# Patient Record
Sex: FEMALE | Race: WHITE | Employment: UNEMPLOYED | URBAN - METROPOLITAN AREA
[De-identification: names, ages, dates, MRNs, and addresses within clinical notes are randomized per-mention and may not be internally consistent; named-entity substitution may affect disease eponyms.]

---

## 2017-03-30 ENCOUNTER — ED HISTORICAL/CONVERTED ENCOUNTER (OUTPATIENT)
Dept: OTHER | Age: 27
End: 2017-03-30

## 2017-04-11 ENCOUNTER — OP HISTORICAL/CONVERTED ENCOUNTER (OUTPATIENT)
Dept: OTHER | Age: 27
End: 2017-04-11

## 2017-04-20 ENCOUNTER — OP HISTORICAL/CONVERTED ENCOUNTER (OUTPATIENT)
Dept: OTHER | Age: 27
End: 2017-04-20

## 2019-09-11 ENCOUNTER — ED HISTORICAL/CONVERTED ENCOUNTER (OUTPATIENT)
Dept: OTHER | Age: 29
End: 2019-09-11

## 2021-09-20 ENCOUNTER — OFFICE VISIT (OUTPATIENT)
Dept: OBGYN CLINIC | Age: 31
End: 2021-09-20
Payer: COMMERCIAL

## 2021-09-20 VITALS
SYSTOLIC BLOOD PRESSURE: 114 MMHG | BODY MASS INDEX: 32.52 KG/M2 | HEIGHT: 64 IN | WEIGHT: 190.5 LBS | DIASTOLIC BLOOD PRESSURE: 68 MMHG

## 2021-09-20 DIAGNOSIS — N92.0 MENORRHAGIA WITH REGULAR CYCLE: ICD-10-CM

## 2021-09-20 DIAGNOSIS — Z01.419 ROUTINE GYNECOLOGICAL EXAMINATION: ICD-10-CM

## 2021-09-20 DIAGNOSIS — Z12.4 SCREENING FOR MALIGNANT NEOPLASM OF CERVIX: Primary | ICD-10-CM

## 2021-09-20 PROCEDURE — 99385 PREV VISIT NEW AGE 18-39: CPT | Performed by: OBSTETRICS & GYNECOLOGY

## 2021-09-20 RX ORDER — NORETHINDRONE ACETATE/ETHINYL ESTRADIOL AND FERROUS FUMARATE 1MG-20(24)
1 KIT ORAL DAILY
Qty: 3 DOSE PACK | Refills: 3 | Status: SHIPPED | OUTPATIENT
Start: 2021-09-20

## 2021-09-20 NOTE — PROGRESS NOTES
Kemal Guzman is a , 33 y.o. female   Patient's last menstrual period was 2021 (approximate). She presents for her annual    She is having no significant problems. Menstrual status:  Cycles are usually regular with a 26-32 day interval with 3-7 day duration. Flow: moderate  To heavy      She does not have dysmenorrhea. Medical conditions:  Since her last annual GYN exam about one year ago, she has not the following changes in her health history: none. Mammogram History:    SHELDON Results (most recent):  No results found for this or any previous visit. DEXA Results (most recent):  No results found for this or any previous visit. Past Medical History:   Diagnosis Date    Asthma     Headache     Obesity      Past Surgical History:   Procedure Laterality Date    HX WISDOM TEETH EXTRACTION         Prior to Admission medications    Medication Sig Start Date End Date Taking? Authorizing Provider   norethindrone-e estradiol-iron (Deborah 24 Fe) 1 mg-20 mcg (24)/75 mg (4) tab Take 1 Tablet by mouth daily. 21  Yes Mahin Flores MD       No Known Allergies       Tobacco History:  reports that she has been smoking cigarettes. She has been smoking about 1.00 pack per day. She has never used smokeless tobacco.  Alcohol Abuse:  reports previous alcohol use. Drug Abuse:  reports previous drug use. Family Medical/Cancer History:   Family History   Problem Relation Age of Onset    Breast Cancer Paternal Aunt     Breast Cancer Maternal Grandfather           Review of Systems   Constitutional: Negative for chills, fever, malaise/fatigue and weight loss. HENT: Negative for congestion, ear pain, sinus pain and tinnitus. Eyes: Negative for blurred vision and double vision. Respiratory: Negative for cough, shortness of breath and wheezing. Cardiovascular: Negative for chest pain and palpitations.    Gastrointestinal: Negative for abdominal pain, blood in stool, constipation, diarrhea, heartburn, nausea and vomiting. Genitourinary: Negative for dysuria, flank pain, frequency, hematuria and urgency. Musculoskeletal: Negative for joint pain and myalgias. Skin: Negative for itching and rash. Neurological: Negative for dizziness, weakness and headaches. Psychiatric/Behavioral: Negative for depression, memory loss and suicidal ideas. The patient is not nervous/anxious and does not have insomnia. Physical Exam  Constitutional:       Appearance: Normal appearance. HENT:      Head: Normocephalic and atraumatic. Cardiovascular:      Rate and Rhythm: Normal rate. Heart sounds: Normal heart sounds. Pulmonary:      Effort: Pulmonary effort is normal.      Breath sounds: Normal breath sounds. Chest:      Breasts:         Right: Normal.         Left: Normal.   Abdominal:      General: Abdomen is flat. Palpations: Abdomen is soft. Genitourinary:     General: Normal vulva. Vagina: Normal.      Cervix: Normal.      Uterus: Normal.       Adnexa: Right adnexa normal and left adnexa normal.      Rectum: Normal.      Comments: PAP Obtained  Neurological:      Mental Status: She is alert. Psychiatric:         Mood and Affect: Mood normal.         Behavior: Behavior normal.         Thought Content: Thought content normal.          Visit Vitals  /68 (BP 1 Location: Right arm, BP Patient Position: Sitting, BP Cuff Size: Small adult)   Ht 5' 4\" (1.626 m)   Wt 190 lb 8 oz (86.4 kg)   LMP 08/29/2021 (Approximate)   BMI 32.70 kg/m²         Assessment:   Diagnoses and all orders for this visit:    1. Screening for malignant neoplasm of cervix  -     PAP IG, RFX APTIMA HPV ASCUS (000091)    2. Routine gynecological examination  -     PAP IG, RFX APTIMA HPV ASCUS ([id number])    3. Menorrhagia with regular cycle    Other orders  -     norethindrone-e estradiol-iron (Deborah 24 Fe) 1 mg-20 mcg (24)/75 mg (4) tab; Take 1 Tablet by mouth daily.         Plan: Questions addressed  Counseled re: diet, exercise, healthy lifestyle  Return for Annual  Rec annual mammogram @ 38      Follow-up and Dispositions    · Return for 1 yr annual.

## 2021-09-20 NOTE — PROGRESS NOTES
Chief Complaint   Patient presents with   [facility] Patient     Annual Exam     Visit Vitals  /68 (BP 1 Location: Right arm, BP Patient Position: Sitting, BP Cuff Size: Small adult)   Ht 5' 4\" (1.626 m)   Wt 190 lb 8 oz (86.4 kg)   LMP 08/29/2021 (Approximate)   BMI 32.70 kg/m²

## 2021-09-21 LAB
CYTOLOGIST CVX/VAG CYTO: NORMAL
CYTOLOGY CVX/VAG DOC CYTO: NORMAL
CYTOLOGY CVX/VAG DOC THIN PREP: NORMAL
DX ICD CODE: NORMAL
LABCORP, 190119: NORMAL
Lab: NORMAL
OTHER STN SPEC: NORMAL
STAT OF ADQ CVX/VAG CYTO-IMP: NORMAL

## 2022-08-24 ENCOUNTER — APPOINTMENT (OUTPATIENT)
Dept: GENERAL RADIOLOGY | Age: 32
End: 2022-08-24
Attending: FAMILY MEDICINE
Payer: COMMERCIAL

## 2022-08-24 ENCOUNTER — HOSPITAL ENCOUNTER (EMERGENCY)
Age: 32
Discharge: HOME | End: 2022-08-24
Attending: FAMILY MEDICINE
Payer: COMMERCIAL

## 2022-08-24 VITALS
WEIGHT: 180 LBS | BODY MASS INDEX: 33.13 KG/M2 | HEIGHT: 62 IN | DIASTOLIC BLOOD PRESSURE: 74 MMHG | TEMPERATURE: 98.5 F | OXYGEN SATURATION: 99 % | RESPIRATION RATE: 16 BRPM | HEART RATE: 93 BPM | SYSTOLIC BLOOD PRESSURE: 142 MMHG

## 2022-08-24 DIAGNOSIS — S99.911A INJURY OF RIGHT ANKLE, INITIAL ENCOUNTER: Primary | ICD-10-CM

## 2022-08-24 PROCEDURE — 99283 EMERGENCY DEPT VISIT LOW MDM: CPT

## 2022-08-24 PROCEDURE — 73610 X-RAY EXAM OF ANKLE: CPT

## 2022-08-24 RX ORDER — IBUPROFEN 800 MG/1
800 TABLET ORAL
Qty: 15 TABLET | Refills: 0 | Status: SHIPPED | OUTPATIENT
Start: 2022-08-24 | End: 2022-08-29

## 2022-08-24 NOTE — ED PROVIDER NOTES
EMERGENCY DEPARTMENT HISTORY AND PHYSICAL EXAM      Date: 8/24/2022  Patient Name: Elizabeth Mcnulty    History of Presenting Illness         History Provided By:     HPI: Elizabeth Mcnulty, is a very pleasant 27 y.o. female presenting to the ED with a chief complaint of right ankle pain. Patient states approximate 3 weeks ago she injured her right ankle. She went to her PCP and was diagnosed with ankle sprain. Since this time she continues to have achy pain on the outside of her right ankle. Worse with walking. No numbness tingling or weakness in extremity. Patient states her  also recently tested positive for COVID-19. She has been congested. No chest pain or shortness of breath. The patient denies any other symptoms at this time. PCP: Other, MD Edgar    No current facility-administered medications on file prior to encounter. Current Outpatient Medications on File Prior to Encounter   Medication Sig Dispense Refill    norethindrone-e estradiol-iron (Deborah 24 Fe) 1 mg-20 mcg (24)/75 mg (4) tab Take 1 Tablet by mouth daily. 3 Dose Pack 3       Past History     Past Medical History:  Past Medical History:   Diagnosis Date    Asthma     Headache     Obesity        Past Surgical History:  Past Surgical History:   Procedure Laterality Date    HX WISDOM TEETH EXTRACTION         Family History:  Family History   Problem Relation Age of Onset    Breast Cancer Paternal Aunt     Breast Cancer Maternal Grandfather        Social History:  Social History     Tobacco Use    Smoking status: Every Day     Packs/day: 1.00     Types: Cigarettes    Smokeless tobacco: Never   Vaping Use    Vaping Use: Never used   Substance Use Topics    Alcohol use: Not Currently    Drug use: Not Currently       Allergies:  No Known Allergies      Review of Systems     Review of Systems   Constitutional:  Negative for activity change, appetite change, chills, fatigue and fever. HENT:  Negative for congestion and sore throat. Eyes:  Negative for photophobia and visual disturbance. Respiratory:  Negative for cough, shortness of breath and wheezing. Cardiovascular:  Negative for chest pain, palpitations and leg swelling. Gastrointestinal:  Negative for abdominal pain, diarrhea, nausea and vomiting. Endocrine: Negative for cold intolerance and heat intolerance. Musculoskeletal:  Positive for arthralgias. Negative for gait problem and joint swelling. Skin:  Negative for color change and rash. Neurological:  Negative for dizziness and headaches. Physical Exam     Physical Exam  Constitutional:       Appearance: She is well-developed. HENT:      Head: Normocephalic and atraumatic. Mouth/Throat:      Mouth: Mucous membranes are moist.      Pharynx: Oropharynx is clear. Eyes:      Conjunctiva/sclera: Conjunctivae normal.      Pupils: Pupils are equal, round, and reactive to light. Cardiovascular:      Rate and Rhythm: Normal rate and regular rhythm. Heart sounds: No murmur heard. Pulmonary:      Effort: No respiratory distress. Breath sounds: No stridor. No wheezing, rhonchi or rales. Abdominal:      General: There is no distension. Tenderness: There is no abdominal tenderness. There is no rebound. Musculoskeletal:      Cervical back: Normal range of motion and neck supple. Comments: Tenderness to palpation inferior to right lateral malleoli. No motor nor sensory deficits. Distal pulses 2+   Skin:     General: Skin is warm and dry. Neurological:      General: No focal deficit present. Mental Status: She is alert and oriented to person, place, and time. Psychiatric:         Mood and Affect: Mood normal.         Behavior: Behavior normal.       Lab and Diagnostic Study Results     Labs -   No results found for this or any previous visit (from the past 12 hour(s)).     Radiologic Studies -   @lastxrresult@  CT Results  (Last 48 hours)      None          CXR Results  (Last 48 hours) None              Medical Decision Making   - I am the first provider for this patient. - I reviewed the vital signs, available nursing notes, past medical history, past surgical history, family history and social history. - Initial assessment performed. The patients presenting problems have been discussed, and they are in agreement with the care plan formulated and outlined with them. I have encouraged them to ask questions as they arise throughout their visit. Vital Signs-Reviewed the patient's vital signs. Patient Vitals for the past 12 hrs:   Temp Pulse Resp BP SpO2   08/24/22 0754 98.5 °F (36.9 °C) 93 16 (!) 142/74 99 %         ED Course/ Provider Notes (Medical Decision Making):     Patient presented to the emergency department with the aforementioned chief complaint. On examination the patient is nontoxic. Vitals were reviewed per above. X-ray reviewed and negative. Discussed supportive measures for likely ankle sprain. Information to follow-up with podiatry. Discussed with patient COVID-19 testing takes 3 days to come back. Recommended home test.    Lee Ann Vanegas was given a thorough list of signs and symptoms that would warrant an immediate return to the emergency department. Otherwise Lee Ann Vanegas will follow up with PCP. Procedures   Medical Decision Makingedical Decision Making  Performed by: Zenaida Peng DO  Procedures  None       Disposition   Disposition:     Home     All of the diagnostic tests were reviewed and questions answered. Diagnosis, care plan and treatment options were discussed. The patient understands the instructions and will follow up as directed. The patients results have been reviewed with them. They have been counseled regarding their diagnosis. The patient verbally convey understanding and agreement of the signs, symptoms, diagnosis, treatment and prognosis and additionally agrees to follow up as recommended with their PCP in 24 - 48 hours.   They also agree with the care-plan and convey that all of their questions have been answered. I have also put together some discharge instructions for them that include: 1) educational information regarding their diagnosis, 2) how to care for their diagnosis at home, as well a 3) list of reasons why they would want to return to the ED prior to their follow-up appointment, should their condition change. DISCHARGE PLAN:    1. Current Discharge Medication List        START taking these medications    Details   ibuprofen (MOTRIN) 800 mg tablet Take 1 Tablet by mouth every eight (8) hours as needed for Pain for up to 5 days. Qty: 15 Tablet, Refills: 0           CONTINUE these medications which have NOT CHANGED    Details   norethindrone-e estradiol-iron (Deborah 24 Fe) 1 mg-20 mcg (24)/75 mg (4) tab Take 1 Tablet by mouth daily. Qty: 3 Dose Pack, Refills: 3               2.   Follow-up Information       Follow up With Specialties Details Why Contact Info    RUTHY Cartwright [facility] Podiatry Call   [facility]  [phone]      Your primary care doctor  Schedule an appointment as soon as possible for a visit in 2 days      Your primary care doctor  Schedule an appointment as soon as possible for a visit in 2 days                3.  Return to ED if worse       4. Current Discharge Medication List        START taking these medications    Details   ibuprofen (MOTRIN) 800 mg tablet Take 1 Tablet by mouth every eight (8) hours as needed for Pain for up to 5 days. Qty: 15 Tablet, Refills: 0  Start date: 8/24/2022, End date: 8/29/2022               Diagnosis     Clinical Impression:    1. Injury of right ankle, initial encounter        Attestations:    Monica Pearson, DO    Please note that this dictation was completed with [employer], the [employer] voice recognition software.   Quite often unanticipated grammatical, syntax, homophones, and other interpretive errors are inadvertently transcribed by the computer software. Please disregard these errors. Please excuse any errors that have escaped final proofreading. Thank you.

## 2022-08-24 NOTE — Clinical Note
Elizabeth [address] [place] [id number]  [phone]    Work/School Note    Date: 8/24/2022    To Whom It May concern:      Wayne Wu was seen and treated today in the emergency room by the following provider(s):  Attending Provider: Shayan Mondragon DO. Wayne Wu is excused from work/school on 08/24/22. She is clear to return to work/school on 08/25/22. COVID home test advised.      Sincerely,          Elba Alcantar, DO

## 2022-08-24 NOTE — ED TRIAGE NOTES
27 yr with right ankle pain. Sprained 3 weeks ago. Pt walking on foot. States pain is work after working all day. Also states  tested for covid and would like to be tested.

## 2022-08-24 NOTE — DISCHARGE INSTRUCTIONS
Thank you! Thank you for allowing me to care for you in the emergency department. It is my goal to provide you with excellent care. If you have not received excellent quality care, please ask to speak to the nurse manager. Please fill out the survey that will come to you by mail or email since we listen to your feedback! Below you will find a list of your tests from today's visit. Should you have any questions, please do not hesitate to call the emergency department. Labs  No results found for this or any previous visit (from the past 12 hour(s)). Radiologic Studies  XR ANKLE RT MIN 3 V   Final Result   No acute fracture or dislocation demonstrated. CT Results  (Last 48 hours)      None          CXR Results  (Last 48 hours)      None          ------------------------------------------------------------------------------------------------------------  The exam and treatment you received in the Emergency Department were for an urgent problem and are not intended as complete care. It is important that you follow-up with a doctor, nurse practitioner, or physician assistant to:  (1) confirm your diagnosis,  (2) re-evaluation of changes in your illness and treatment, and  (3) for ongoing care. Please take your discharge instructions with you when you go to your follow-up appointment. If you have any problem arranging a follow-up appointment, contact the Emergency Department. If your symptoms become worse or you do not improve as expected and you are unable to reach your health care provider, please return to the Emergency Department. We are available 24 hours a day. If a prescription has been provided, please have it filled as soon as possible to prevent a delay in treatment. If you have any questions or reservations about taking the medication due to side effects or interactions with other medications, please call your primary care provider or contact the ER.

## 2023-05-18 RX ORDER — NORETHINDRONE ACETATE/ETHINYL ESTRADIOL AND FERROUS FUMARATE 1MG-20(24)
1 KIT ORAL DAILY
COMMUNITY
Start: 2021-09-20

## 2023-08-15 ENCOUNTER — TELEPHONE (OUTPATIENT)
Age: 33
End: 2023-08-15

## 2023-08-15 NOTE — TELEPHONE ENCOUNTER
Called patient to follow up on referral to [facility]. No answer, left voicemail advising patient to return call.

## 2023-08-22 RX ORDER — SUMATRIPTAN 100 MG/1
100 TABLET, FILM COATED ORAL
COMMUNITY

## 2023-08-31 ENCOUNTER — PROCEDURE VISIT (OUTPATIENT)
Age: 33
End: 2023-08-31

## 2023-08-31 VITALS
HEART RATE: 71 BPM | WEIGHT: 166 LBS | TEMPERATURE: 98.1 F | OXYGEN SATURATION: 98 % | RESPIRATION RATE: 16 BRPM | HEIGHT: 62 IN | SYSTOLIC BLOOD PRESSURE: 119 MMHG | BODY MASS INDEX: 30.55 KG/M2 | DIASTOLIC BLOOD PRESSURE: 73 MMHG

## 2023-08-31 DIAGNOSIS — L98.9 SKIN LESION: Primary | ICD-10-CM

## 2023-08-31 RX ORDER — OXYCODONE HYDROCHLORIDE AND ACETAMINOPHEN 5; 325 MG/1; MG/1
1 TABLET ORAL EVERY 6 HOURS PRN
Qty: 18 TABLET | Refills: 0 | Status: SHIPPED | OUTPATIENT
Start: 2023-08-31 | End: 2023-09-05

## 2023-09-09 LAB
CPT BILLING CODE: NORMAL
DIAGNOSIS SYNOPSIS:: NORMAL
DX ICD CODE: NORMAL
DX ICD CODE: NORMAL
PATH REPORT.FINAL DX SPEC: NORMAL
PATH REPORT.GROSS SPEC: NORMAL
PATH REPORT.MICROSCOPIC SPEC OTHER STN: NORMAL
PATH REPORT.SITE OF ORIGIN SPEC: NORMAL
PATHOLOGIST NAME: NORMAL
PAYMENT PROCEDURE: NORMAL

## 2024-02-07 DIAGNOSIS — O36.80X0 ENCOUNTER TO DETERMINE FETAL VIABILITY OF PREGNANCY, SINGLE OR UNSPECIFIED FETUS: Primary | ICD-10-CM

## 2024-02-09 ENCOUNTER — INITIAL PRENATAL (OUTPATIENT)
Age: 34
End: 2024-02-09

## 2024-02-09 VITALS
WEIGHT: 138.5 LBS | DIASTOLIC BLOOD PRESSURE: 68 MMHG | SYSTOLIC BLOOD PRESSURE: 110 MMHG | BODY MASS INDEX: 20.99 KG/M2 | HEIGHT: 68 IN

## 2024-02-09 DIAGNOSIS — Z11.3 SCREENING EXAMINATION FOR VENEREAL DISEASE: ICD-10-CM

## 2024-02-09 DIAGNOSIS — R73.03 PRE-DIABETES: ICD-10-CM

## 2024-02-09 DIAGNOSIS — O09.521 AMA (ADVANCED MATERNAL AGE) MULTIGRAVIDA 35+, FIRST TRIMESTER: ICD-10-CM

## 2024-02-09 DIAGNOSIS — Z12.4 PAP SMEAR FOR CERVICAL CANCER SCREENING: ICD-10-CM

## 2024-02-09 DIAGNOSIS — Z34.81 PRENATAL CARE, SUBSEQUENT PREGNANCY, FIRST TRIMESTER: Primary | ICD-10-CM

## 2024-02-09 PROCEDURE — 0501F PRENATAL FLOW SHEET: CPT | Performed by: OBSTETRICS & GYNECOLOGY

## 2024-02-09 ASSESSMENT — ENCOUNTER SYMPTOMS
GASTROINTESTINAL NEGATIVE: 1
RESPIRATORY NEGATIVE: 1

## 2024-02-09 NOTE — PROGRESS NOTES
Marlene Caldwell is a , 34 y.o. female   No LMP recorded (lmp unknown). Patient is pregnant.    She presents for her new OB    She is having no significant problems.      Menstrual status:  Cycles are pregnant.    Flow: absent.      She does not have dysmenorrhea.      Medical conditions:  Since her last annual GYN exam about one year ago, she has not the following changes in her health history: none.     Mammogram History:    GIUL[facility] (most recent[facility]  [id number]@     DEXA [facility] (most recent[facility]  [facility][id number]@       Past Medical History:   Diagnosis Date    Asthma     Headache     Obesity      Past Surgical History:   Procedure Laterality Date    WISDOM TOOTH EXTRACTION         Prior to Admission medications    Not on File       No Known Allergies       Tobacco History:  reports that she has been smoking cigarettes. She has never used smokeless tobacco.  Alcohol use:  reports that she does not currently use alcohol.  Drug use:  reports that she does not currently use drugs.    Family Medical/Cancer History:   Family History   Problem Relation Age of Onset    Breast Cancer Maternal Grandfather     Breast Cancer Paternal Aunt         Review of Systems   Constitutional:  Positive for fatigue.   Respiratory: Negative.     Cardiovascular: Negative.    Gastrointestinal: Negative.    Genitourinary:  Positive for frequency.   Musculoskeletal: Negative.    Neurological: Negative.    Psychiatric/Behavioral: Negative.           /68 (Site: Left Upper Arm, Position: Sitting, Cuff Size: Small Adult)   Ht 1.727 m (5' 8\")   Wt 62.8 kg (138 lb 8 oz)   LMP  (LMP Unknown)   BMI 21.06 kg/m²     Physical Exam  Cardiovascular:      Rate and Rhythm: Normal rate.   Genitourinary:     General: Normal vulva.      Cervix: Normal.      Uterus: Normal.       Comments: Pap collected  Neurological:      Mental Status: She is alert.   Psychiatric:         Mood and Affect: Mood normal.

## 2024-02-10 ENCOUNTER — HOSPITAL ENCOUNTER (EMERGENCY)
Facility: HOSPITAL | Age: 34
Discharge: HOME | End: 2024-02-10
Attending: EMERGENCY MEDICINE
Payer: COMMERCIAL

## 2024-02-10 ENCOUNTER — APPOINTMENT (OUTPATIENT)
Facility: HOSPITAL | Age: 34
End: 2024-02-10
Payer: COMMERCIAL

## 2024-02-10 VITALS
HEIGHT: 62 IN | TEMPERATURE: 98.1 F | BODY MASS INDEX: 25.4 KG/M2 | WEIGHT: 138 LBS | RESPIRATION RATE: 18 BRPM | SYSTOLIC BLOOD PRESSURE: 143 MMHG | DIASTOLIC BLOOD PRESSURE: 78 MMHG | OXYGEN SATURATION: 100 % | HEART RATE: 93 BPM

## 2024-02-10 DIAGNOSIS — O20.0 THREATENED MISCARRIAGE: Primary | ICD-10-CM

## 2024-02-10 LAB
ABO GROUP BLD: NORMAL
ALBUMIN SERPL-MCNC: 3.6 G/DL (ref 3.5–5)
ALBUMIN/GLOB SERPL: 1 (ref 1.1–2.2)
ALP SERPL-CCNC: 65 U/L (ref 45–117)
ALT SERPL-CCNC: 41 U/L (ref 12–78)
ANION GAP SERPL CALC-SCNC: 3 MMOL/L (ref 5–15)
APPEARANCE UR: ABNORMAL
AST SERPL W P-5'-P-CCNC: ABNORMAL U/L (ref 15–37)
BACTERIA URNS QL MICRO: NEGATIVE /HPF
BASOPHILS # BLD AUTO: 0.1 X10E3/UL (ref 0–0.2)
BASOPHILS # BLD: 0.1 K/UL (ref 0–0.1)
BASOPHILS NFR BLD AUTO: 0 %
BASOPHILS NFR BLD: 1 % (ref 0–1)
BILIRUB SERPL-MCNC: 0.3 MG/DL (ref 0.2–1)
BILIRUB UR QL: NEGATIVE
BLD GP AB SCN SERPL QL: NEGATIVE
BUN SERPL-MCNC: 8 MG/DL (ref 6–20)
BUN/CREAT SERPL: 15 (ref 12–20)
CA-I BLD-MCNC: 9.2 MG/DL (ref 8.5–10.1)
CHLORIDE SERPL-SCNC: 109 MMOL/L (ref 97–108)
CO2 SERPL-SCNC: 25 MMOL/L (ref 21–32)
COLOR UR: ABNORMAL
CREAT SERPL-MCNC: 0.53 MG/DL (ref 0.55–1.02)
DIFFERENTIAL METHOD BLD: ABNORMAL
EOSINOPHIL # BLD AUTO: 0.2 X10E3/UL (ref 0–0.4)
EOSINOPHIL # BLD: 0.3 K/UL (ref 0–0.4)
EOSINOPHIL NFR BLD AUTO: 2 %
EOSINOPHIL NFR BLD: 2 % (ref 0–7)
EPITH CASTS URNS QL MICRO: ABNORMAL /LPF
ERYTHROCYTE [DISTWIDTH] IN BLOOD BY AUTOMATED COUNT: 11.7 % (ref 11.7–15.4)
ERYTHROCYTE [DISTWIDTH] IN BLOOD BY AUTOMATED COUNT: 12.8 % (ref 11.5–14.5)
GLOBULIN SER CALC-MCNC: 3.6 G/DL (ref 2–4)
GLUCOSE SERPL-MCNC: 112 MG/DL (ref 65–100)
GLUCOSE UR STRIP.AUTO-MCNC: NEGATIVE MG/DL
HBV SURFACE AG SERPL QL IA: NEGATIVE
HCG SERPL-ACNC: ABNORMAL MIU/ML (ref 0–6)
HCT VFR BLD AUTO: 37.8 % (ref 35–47)
HCT VFR BLD AUTO: 39.8 % (ref 34–46.6)
HCV AB SERPL QL IA: NORMAL
HCV IGG SERPL QL IA: NON REACTIVE
HGB BLD-MCNC: 12.7 G/DL (ref 11.5–16)
HGB BLD-MCNC: 12.8 G/DL (ref 11.1–15.9)
HGB UR QL STRIP: ABNORMAL
HIV 1+2 AB+HIV1 P24 AG SERPL QL IA: NON REACTIVE
IMM GRANULOCYTES # BLD AUTO: 0 X10E3/UL (ref 0–0.1)
IMM GRANULOCYTES # BLD AUTO: 0.1 K/UL (ref 0–0.04)
IMM GRANULOCYTES NFR BLD AUTO: 0 %
IMM GRANULOCYTES NFR BLD AUTO: 0 % (ref 0–0.5)
KETONES UR QL STRIP.AUTO: NEGATIVE MG/DL
LEUKOCYTE ESTERASE UR QL STRIP.AUTO: ABNORMAL
LYMPHOCYTES # BLD AUTO: 4.1 X10E3/UL (ref 0.7–3.1)
LYMPHOCYTES # BLD: 4.8 K/UL (ref 0.8–3.5)
LYMPHOCYTES NFR BLD AUTO: 29 %
LYMPHOCYTES NFR BLD: 34 % (ref 12–49)
MCH RBC QN AUTO: 32.6 PG (ref 26.6–33)
MCH RBC QN AUTO: 32.8 PG (ref 26–34)
MCHC RBC AUTO-ENTMCNC: 32.2 G/DL (ref 31.5–35.7)
MCHC RBC AUTO-ENTMCNC: 33.6 G/DL (ref 30–36.5)
MCV RBC AUTO: 101 FL (ref 79–97)
MCV RBC AUTO: 97.7 FL (ref 80–99)
MONOCYTES # BLD AUTO: 0.8 X10E3/UL (ref 0.1–0.9)
MONOCYTES # BLD: 0.9 K/UL (ref 0–1)
MONOCYTES NFR BLD AUTO: 6 %
MONOCYTES NFR BLD: 6 % (ref 5–13)
MUCOUS THREADS URNS QL MICRO: ABNORMAL /LPF
NEUTROPHILS # BLD AUTO: 8.6 X10E3/UL (ref 1.4–7)
NEUTROPHILS NFR BLD AUTO: 63 %
NEUTS SEG # BLD: 7.9 K/UL (ref 1.8–8)
NEUTS SEG NFR BLD: 57 % (ref 32–75)
NITRITE UR QL STRIP.AUTO: NEGATIVE
NRBC # BLD: 0 K/UL (ref 0–0.01)
NRBC BLD-RTO: 0 PER 100 WBC
PH UR STRIP: 7 (ref 5–8)
PLATELET # BLD AUTO: 275 K/UL (ref 150–400)
PLATELET # BLD AUTO: 279 X10E3/UL (ref 150–450)
PMV BLD AUTO: 10.5 FL (ref 8.9–12.9)
POTASSIUM SERPL-SCNC: ABNORMAL MMOL/L (ref 3.5–5.1)
PROT SERPL-MCNC: 7.2 G/DL (ref 6.4–8.2)
PROT UR STRIP-MCNC: NEGATIVE MG/DL
RBC # BLD AUTO: 3.87 M/UL (ref 3.8–5.2)
RBC # BLD AUTO: 3.93 X10E6/UL (ref 3.77–5.28)
RBC #/AREA URNS HPF: ABNORMAL /HPF (ref 0–5)
RH BLD: POSITIVE
RPR SER QL: NON REACTIVE
RUBV IGG SERPL IA-ACNC: 1.41 INDEX
SODIUM SERPL-SCNC: 137 MMOL/L (ref 136–145)
SP GR UR REFRACTOMETRY: 1.02 (ref 1–1.03)
URINE CULTURE IF INDICATED: ABNORMAL
UROBILINOGEN UR QL STRIP.AUTO: 2 EU/DL (ref 0.1–1)
WBC # BLD AUTO: 13.8 X10E3/UL (ref 3.4–10.8)
WBC # BLD AUTO: 13.9 K/UL (ref 3.6–11)
WBC URNS QL MICRO: ABNORMAL /HPF (ref 0–4)

## 2024-02-10 PROCEDURE — 80053 COMPREHEN METABOLIC PANEL: CPT

## 2024-02-10 PROCEDURE — 81001 URINALYSIS AUTO W/SCOPE: CPT

## 2024-02-10 PROCEDURE — 99284 EMERGENCY DEPT VISIT MOD MDM: CPT

## 2024-02-10 PROCEDURE — 36415 COLL VENOUS BLD VENIPUNCTURE: CPT

## 2024-02-10 PROCEDURE — 84702 CHORIONIC GONADOTROPIN TEST: CPT

## 2024-02-10 PROCEDURE — 85025 COMPLETE CBC W/AUTO DIFF WBC: CPT

## 2024-02-10 PROCEDURE — 76801 OB US < 14 WKS SINGLE FETUS: CPT

## 2024-02-10 ASSESSMENT — LIFESTYLE VARIABLES
HOW OFTEN DO YOU HAVE A DRINK CONTAINING ALCOHOL: NEVER
HOW MANY STANDARD DRINKS CONTAINING ALCOHOL DO YOU HAVE ON A TYPICAL DAY: PATIENT DOES NOT DRINK

## 2024-02-10 ASSESSMENT — PAIN SCALES - GENERAL: PAINLEVEL_OUTOF10: 0

## 2024-02-10 NOTE — ED TRIAGE NOTES
Pt is 10 weeks pregnant and had a pap smear yesterday and is now complaining of vaginal bleeding that she noticed when she went to use the restroom. Pt noticed some clots in the toilet and her OB wanted her to come and be seen. Followed by Dr. Jovel.

## 2024-02-10 NOTE — ED PROVIDER NOTES
[facility] EMERGENCY DEPT  EMERGENCY DEPARTMENT HISTORY AND PHYSICAL EXAM      Date: 2/10/2024  Patient Name: Marlene Caldwell  MRN: [id number]  Birthdate 1990  Date of evaluation: 2/10/2024  Provider: Brian Lind DO   Note Started: 5:14 PM EST 2/10/24    HISTORY OF PRESENT ILLNESS     Chief Complaint   Patient presents with    Vaginal Bleeding     History Provided By: Patient    HPI: Marlene Caldwell is a  34 y.o. female with past medical history of asthma  who presents with vaginal bleeding in pregnancy.  Patient is about 10 weeks gestational age.  She states that she has been having some mild cramping.  She had a Pap smear yesterday.  States that she notices vaginal bleeding mostly when she is wiping and is mild.    PAST MEDICAL HISTORY   Past Medical History:  Past Medical History:   Diagnosis Date    Asthma     Headache     Obesity        Past Surgical History:  Past Surgical History:   Procedure Laterality Date    WISDOM TOOTH EXTRACTION         Family History:  Family History   Problem Relation Age of Onset    Breast Cancer Maternal Grandfather     Breast Cancer Paternal Aunt        Social History:  Social History     Tobacco Use    Smoking status: Every Day     Current packs/day: 1.00     Types: Cigarettes    Smokeless tobacco: Never   Vaping Use    Vaping Use: Never used   Substance Use Topics    Alcohol use: Not Currently    Drug use: Not Currently       Allergies:  No Known Allergies    PCP: Andrea Ordaz MD    Current Meds:   No current facility-administered medications for this encounter.     No current outpatient medications on file.     Social Determinants of Health:   Social Determinants of Health     Tobacco Use: High Risk (2/10/2024)    Patient History     Smoking Tobacco Use: Every Day     Smokeless Tobacco Use: Never     Passive Exposure: Not on file   Alcohol Use: Not At Risk (2/10/2024)    AUDIT-C     Frequency of Alcohol Consumption: Never     Average Number of Drinks: Patient does not  0.1 K/UL    Absolute Immature Granulocyte 0.1 (H) 0.00 - 0.04 K/UL    Differential Type AUTOMATED     Comprehensive Metabolic Panel    Collection Time: 02/10/24  5:13 PM   Result Value Ref Range    Sodium 137 136 - 145 mmol/L    Potassium Hemolyzed, Recollection Recommended 3.5 - 5.1 mmol/L    Chloride 109 (H) 97 - 108 mmol/L    CO2 25 21 - 32 mmol/L    Anion Gap 3 (L) 5 - 15 mmol/L    Glucose 112 (H) 65 - 100 mg/dL    BUN 8 6 - 20 mg/dL    Creatinine 0.53 (L) 0.55 - 1.02 mg/dL    Bun/Cre Ratio 15 12 - 20      Est, Glom Filt Rate >60 >60 ml/min/1.73m2    Calcium 9.2 8.5 - 10.1 mg/dL    Total Bilirubin 0.3 0.2 - 1.0 mg/dL    AST Hemolyzed, Recollection Recommended 15 - 37 U/L    ALT 41 12 - 78 U/L    Alk Phosphatase 65 45 - 117 U/L    Total Protein 7.2 6.4 - 8.2 g/dL    Albumin 3.6 3.5 - 5.0 g/dL    Globulin 3.6 2.0 - 4.0 g/dL    Albumin/Globulin Ratio 1.0 (L) 1.1 - 2.2     HCG, Quantitative, Pregnancy    Collection Time: 02/10/24  5:13 PM   Result Value Ref Range    hCG Quant 23,784 (H) 0 - 6 mIU/mL   Urinalysis with Reflex to Culture    Collection Time: 02/10/24  5:13 PM    Specimen: Urine   Result Value Ref Range    Color, UA Yellow/Straw      Appearance Turbid (A) Clear      Specific Gravity, UA 1.017 1.003 - 1.030      pH, Urine 7.0 5.0 - 8.0      Protein, UA Negative Negative mg/dL    Glucose, UA Negative Negative mg/dL    Ketones, Urine Negative Negative mg/dL    Bilirubin Urine Negative Negative      Blood, Urine Moderate (A) Negative      Urobilinogen, Urine 2.0 (H) 0.1 - 1.0 EU/dL    Nitrite, Urine Negative Negative      Leukocyte Esterase, Urine Trace (A) Negative      WBC, UA 0-4 0 - 4 /hpf    Urine Culture if Indicated Culture not indicated by UA result Culture not indicated by UA result      RBC, UA 0-5 0 - 5 /hpf    Epithelial Cells UA Moderate (A) Few /lpf    BACTERIA, URINE Negative Negative /hpf    Mucus, UA Trace /lpf       EKG: Initial EKG interpreted by me shows: Not applicable.    Radiologic

## 2024-02-11 NOTE — ED NOTES
US called for update, states they are in route (coming from >40 min away) will be here soon.   Patient called out asking for update, informed of status. MD aware.

## 2024-02-11 NOTE — DISCHARGE INSTRUCTIONS
problem arranging a follow-up appointment, contact the Emergency Department.  If your symptoms become worse or you do not improve as expected and you are unable to reach your health care provider, please return to the Emergency Department. We are available 24 hours a day.     If a prescription has been provided, please have it filled as soon as possible to prevent a delay in treatment. If you have any questions or reservations about taking the medication due to side effects or interactions with other medications, please call your primary care provider or contact the ER.

## 2024-02-12 ENCOUNTER — TELEPHONE (OUTPATIENT)
Age: 34
End: 2024-02-12

## 2024-02-12 NOTE — TELEPHONE ENCOUNTER
Patient called stating she was seen for her first ob visit on Friday and was advised she may have some spotting.  States she had some spotting with some small clotting on Saturday and called the on call physician who directed her to the ER.  Patient had an ultrasound which showed a viable IUP at 9 weeks and 6 days.  States she was uneasy as the ultrasound tech would let her hear the heartbeat or see the screen.  States she felt that was rude.  Advised the patient there was a viable IUP with a noted heart rate of 165 bmp.  Patient states her bleeding has since stopped.  Advised her Dr Jovel will be forwarded her information and he will contact her.  She states she was advised to be seen by the on call physician.

## 2024-02-14 NOTE — TELEPHONE ENCOUNTER
Left a message on the patient's voicemail per Dr Jovel that she doesn't need to be seen earlier than her scheduled appointment unless she is experiencing heavy vaginal bleeding.  She was advised to monitor her spotting.

## 2024-02-15 LAB
CFDNA.FET/CFDNA.TOTAL SFR FETUS: NORMAL %
CITATION REF LAB TEST: NORMAL
FET 13+18+21+X+Y ANEUP PLAS.CFDNA: NEGATIVE
FET CHR 21 TS PLAS.CFDNA QL: NEGATIVE
FET SEX PLAS.CFDNA DOSAGE CFDNA: NORMAL
FET TS 13 RISK PLAS.CFDNA QL: NEGATIVE
FET TS 18 RISK WBC.DNA+CFDNA QL: NEGATIVE
GA EST FROM CONCEPTION DATE: NORMAL D
GESTATIONAL AGE > OR = 9 WEEKS: YES
LAB DIRECTOR NAME PROVIDER: NORMAL
LAB DIRECTOR NAME PROVIDER: NORMAL
LABORATORY COMMENT REPORT: NORMAL
LIMITATIONS OF THE TEST: NORMAL
Lab: NORMAL
NEGATIVE PREDICTIVE VALUE: NORMAL
PERFORMANCE CHARACTERISTICS: NORMAL
POSITIVE PREDICTIVE VALUE: NORMAL
REF LAB TEST METHOD: NORMAL
TEST PERFORMANCE INFO SPEC: NORMAL

## 2024-02-19 LAB
., LABCORP: NORMAL
C TRACH RRNA CVX QL NAA+PROBE: NEGATIVE
CYTOLOGIST CVX/VAG CYTO: NORMAL
CYTOLOGY CVX/VAG DOC CYTO: NORMAL
CYTOLOGY CVX/VAG DOC THIN PREP: NORMAL
DX ICD CODE: NORMAL
Lab: NORMAL
N GONORRHOEA RRNA CVX QL NAA+PROBE: NEGATIVE
OTHER STN SPEC: NORMAL
STAT OF ADQ CVX/VAG CYTO-IMP: NORMAL

## 2024-03-14 ENCOUNTER — ROUTINE PRENATAL (OUTPATIENT)
Age: 34
End: 2024-03-14

## 2024-03-14 VITALS
WEIGHT: 147.38 LBS | DIASTOLIC BLOOD PRESSURE: 64 MMHG | BODY MASS INDEX: 27.12 KG/M2 | SYSTOLIC BLOOD PRESSURE: 110 MMHG | HEIGHT: 62 IN

## 2024-03-14 DIAGNOSIS — Z3A.15 15 WEEKS GESTATION OF PREGNANCY: Primary | ICD-10-CM

## 2024-03-14 PROCEDURE — 0502F SUBSEQUENT PRENATAL CARE: CPT | Performed by: OBSTETRICS & GYNECOLOGY

## 2024-04-18 ENCOUNTER — ROUTINE PRENATAL (OUTPATIENT)
Age: 34
End: 2024-04-18

## 2024-04-18 VITALS
BODY MASS INDEX: 28.71 KG/M2 | SYSTOLIC BLOOD PRESSURE: 110 MMHG | DIASTOLIC BLOOD PRESSURE: 70 MMHG | WEIGHT: 156 LBS | HEIGHT: 62 IN

## 2024-04-18 DIAGNOSIS — Z3A.20 20 WEEKS GESTATION OF PREGNANCY: Primary | ICD-10-CM

## 2024-04-18 NOTE — PROGRESS NOTES
GS/hgb nv & instructions were given. Give breast pump order & discuss tdap nv. No current complaints.

## 2024-05-14 ENCOUNTER — ROUTINE PRENATAL (OUTPATIENT)
Age: 34
End: 2024-05-14

## 2024-05-14 VITALS
HEIGHT: 62 IN | DIASTOLIC BLOOD PRESSURE: 72 MMHG | BODY MASS INDEX: 29.86 KG/M2 | SYSTOLIC BLOOD PRESSURE: 112 MMHG | WEIGHT: 162.25 LBS

## 2024-05-14 DIAGNOSIS — O99.012 ANEMIA IN PREGNANCY, SECOND TRIMESTER: ICD-10-CM

## 2024-05-14 DIAGNOSIS — Z3A.23 23 WEEKS GESTATION OF PREGNANCY: ICD-10-CM

## 2024-05-14 DIAGNOSIS — R39.9 LOWER URINARY TRACT SYMPTOMS: ICD-10-CM

## 2024-05-14 DIAGNOSIS — Z13.1 SCREENING FOR DIABETES MELLITUS: ICD-10-CM

## 2024-05-14 PROCEDURE — 0502F SUBSEQUENT PRENATAL CARE: CPT | Performed by: OBSTETRICS & GYNECOLOGY

## 2024-05-14 RX ORDER — NITROFURANTOIN 25; 75 MG/1; MG/1
100 CAPSULE ORAL 2 TIMES DAILY
Qty: 10 CAPSULE | Refills: 0 | Status: SHIPPED | OUTPATIENT
Start: 2024-05-14 | End: 2024-05-19

## 2024-05-14 NOTE — PROGRESS NOTES
Having a pinching sensation in the vagina ~ 2 weeks. Had spotting when wiping x 1 . UA=2+ leuk. GS/Hgb done today. Breast pump order given with instructions

## 2024-05-15 DIAGNOSIS — O99.810 ABNORMAL GLUCOSE TOLERANCE AFFECTING PREGNANCY, ANTEPARTUM: Primary | ICD-10-CM

## 2024-05-15 LAB
GLUCOSE 1H P 50 G GLC PO SERPL-MCNC: 161 MG/DL (ref 70–139)
HGB BLD-MCNC: 10.7 G/DL (ref 11.1–15.9)

## 2024-06-11 LAB
GLUCOSE 1H P 100 G GLC PO SERPL-MCNC: 151 MG/DL (ref 70–179)
GLUCOSE 2H P 100 G GLC PO SERPL-MCNC: 156 MG/DL (ref 70–154)
GLUCOSE 3H P 100 G GLC PO SERPL-MCNC: 125 MG/DL (ref 70–139)
GLUCOSE P FAST SERPL-MCNC: 99 MG/DL (ref 70–94)
Lab: ABNORMAL

## 2024-06-14 ENCOUNTER — ROUTINE PRENATAL (OUTPATIENT)
Age: 34
End: 2024-06-14

## 2024-06-14 VITALS
HEIGHT: 62 IN | WEIGHT: 167.25 LBS | DIASTOLIC BLOOD PRESSURE: 66 MMHG | BODY MASS INDEX: 30.78 KG/M2 | SYSTOLIC BLOOD PRESSURE: 118 MMHG

## 2024-06-14 DIAGNOSIS — O24.410 DIET CONTROLLED GESTATIONAL DIABETES MELLITUS (GDM) IN THIRD TRIMESTER: ICD-10-CM

## 2024-06-14 DIAGNOSIS — Z36.2 ENCOUNTER FOR FOLLOW-UP ULTRASOUND OF FETAL ANATOMY: Primary | ICD-10-CM

## 2024-06-14 DIAGNOSIS — Z3A.28 28 WEEKS GESTATION OF PREGNANCY: Primary | ICD-10-CM

## 2024-06-14 PROCEDURE — 0502F SUBSEQUENT PRENATAL CARE: CPT | Performed by: OBSTETRICS & GYNECOLOGY

## 2024-06-14 NOTE — PROGRESS NOTES
Questions addressed  Hx of PTD at 35 weeks  US results DWP  HEENA SRINIVASANP- will check BS QID  US for growth 4 weeks  Will send BS log thru MyChart

## 2024-06-15 ENCOUNTER — PATIENT MESSAGE (OUTPATIENT)
Age: 34
End: 2024-06-15

## 2024-06-17 RX ORDER — GLUCOSAMINE HCL/CHONDROITIN SU 500-400 MG
CAPSULE ORAL
Qty: 400 STRIP | Refills: 1 | Status: SHIPPED | OUTPATIENT
Start: 2024-06-17

## 2024-06-17 RX ORDER — LANCETS 30 GAUGE
1 EACH MISCELLANEOUS 4 TIMES DAILY
Qty: 600 EACH | Refills: 1 | Status: SHIPPED | OUTPATIENT
Start: 2024-06-17

## 2024-06-25 NOTE — PROGRESS NOTES
This note/encounter/order is being administratively closed in accordance with the defined policies, procedures and workflows established by [facility]. I cannot validate the clinical content accuracy of this information.

## 2024-06-27 DIAGNOSIS — O24.410 DIET CONTROLLED GESTATIONAL DIABETES MELLITUS (GDM) IN THIRD TRIMESTER: Primary | ICD-10-CM

## 2024-06-27 RX ORDER — GLYBURIDE 2.5 MG/1
2.5 TABLET ORAL 2 TIMES DAILY WITH MEALS
Qty: 60 TABLET | Refills: 2 | Status: SHIPPED | OUTPATIENT
Start: 2024-06-27

## 2024-07-10 ENCOUNTER — ROUTINE PRENATAL (OUTPATIENT)
Age: 34
End: 2024-07-10

## 2024-07-10 DIAGNOSIS — Z3A.32 32 WEEKS GESTATION OF PREGNANCY: ICD-10-CM

## 2024-07-10 DIAGNOSIS — Z34.83 PRENATAL CARE, SUBSEQUENT PREGNANCY, THIRD TRIMESTER: Primary | ICD-10-CM

## 2024-07-10 DIAGNOSIS — R10.2 PAIN OF ROUND LIGAMENT AFFECTING PREGNANCY, ANTEPARTUM: ICD-10-CM

## 2024-07-10 DIAGNOSIS — O24.415 GESTATIONAL DIABETES MELLITUS (GDM) IN THIRD TRIMESTER CONTROLLED ON ORAL HYPOGLYCEMIC DRUG: ICD-10-CM

## 2024-07-10 DIAGNOSIS — O26.899 PAIN OF ROUND LIGAMENT AFFECTING PREGNANCY, ANTEPARTUM: ICD-10-CM

## 2024-07-10 PROBLEM — O24.410 DIET CONTROLLED GESTATIONAL DIABETES MELLITUS (GDM) IN THIRD TRIMESTER: Status: RESOLVED | Noted: 2024-06-14 | Resolved: 2024-07-10

## 2024-07-10 PROCEDURE — 0502F SUBSEQUENT PRENATAL CARE: CPT | Performed by: OBSTETRICS & GYNECOLOGY

## 2024-07-10 NOTE — PROGRESS NOTES
34 y.o.   at 32w0d, BTG: O Positive     Complains of: Abdominal pain.    1. Prenatal care, subsequent pregnancy, third trimester      2. 32 weeks gestation of pregnancy      3. Gestational diabetes mellitus (GDM) in third trimester controlled on oral hypoglycemic drug  Normoglycemic    4. Pain of round ligament affecting pregnancy, antepartum  Physical exam findings are compatible with round ligament pain.  Patient reassured after pelvic exam.        Return in about 1 week (around 2024) for NICOLE.     Richard Talley M.D.  [facility]  Obstetrics and Gynecology  [phone]

## 2024-07-15 DIAGNOSIS — Z36.89 ENCOUNTER FOR ULTRASOUND TO CHECK FETAL GROWTH: Primary | ICD-10-CM

## 2024-07-17 ENCOUNTER — ROUTINE PRENATAL (OUTPATIENT)
Age: 34
End: 2024-07-17

## 2024-07-17 VITALS
BODY MASS INDEX: 30.78 KG/M2 | HEIGHT: 62 IN | WEIGHT: 167.25 LBS | SYSTOLIC BLOOD PRESSURE: 112 MMHG | DIASTOLIC BLOOD PRESSURE: 68 MMHG

## 2024-07-17 DIAGNOSIS — R73.03 PRE-DIABETES: ICD-10-CM

## 2024-07-17 DIAGNOSIS — Z3A.33 33 WEEKS GESTATION OF PREGNANCY: Primary | ICD-10-CM

## 2024-07-17 NOTE — PROGRESS NOTES
Questions addressed  Pt having a lot of pelvic pressure, and lower back pain with increased number of contractions when at work.- will take out of work until delivery  BS per pt good control on PO meds  PO fluids in heat recommended  [facility]

## 2024-07-31 ENCOUNTER — ROUTINE PRENATAL (OUTPATIENT)
Age: 34
End: 2024-07-31

## 2024-07-31 VITALS
HEIGHT: 62 IN | DIASTOLIC BLOOD PRESSURE: 72 MMHG | WEIGHT: 170.13 LBS | BODY MASS INDEX: 31.31 KG/M2 | SYSTOLIC BLOOD PRESSURE: 122 MMHG

## 2024-07-31 DIAGNOSIS — Z11.3 SCREENING EXAMINATION FOR VENEREAL DISEASE: ICD-10-CM

## 2024-07-31 DIAGNOSIS — Z36.85 ANTENATAL SCREENING FOR STREPTOCOCCUS B: ICD-10-CM

## 2024-07-31 DIAGNOSIS — O99.013 ANEMIA IN PREGNANCY, THIRD TRIMESTER: Primary | ICD-10-CM

## 2024-07-31 PROCEDURE — 0502F SUBSEQUENT PRENATAL CARE: CPT | Performed by: OBSTETRICS & GYNECOLOGY

## 2024-08-03 LAB
C TRACH RRNA SPEC QL NAA+PROBE: NEGATIVE
GP B STREP DNA SPEC QL NAA+PROBE: NEGATIVE
N GONORRHOEA RRNA SPEC QL NAA+PROBE: NEGATIVE

## 2024-08-06 LAB
HGB BLD-MCNC: 10.8 G/DL (ref 11.1–15.9)
RPR SER QL: NON REACTIVE

## 2024-08-07 ENCOUNTER — ROUTINE PRENATAL (OUTPATIENT)
Age: 34
End: 2024-08-07

## 2024-08-07 VITALS
WEIGHT: 170.13 LBS | BODY MASS INDEX: 31.31 KG/M2 | SYSTOLIC BLOOD PRESSURE: 114 MMHG | DIASTOLIC BLOOD PRESSURE: 74 MMHG | HEIGHT: 62 IN

## 2024-08-07 DIAGNOSIS — Z3A.36 36 WEEKS GESTATION OF PREGNANCY: Primary | ICD-10-CM

## 2024-08-07 DIAGNOSIS — R73.03 PRE-DIABETES: ICD-10-CM

## 2024-08-07 PROCEDURE — 0502F SUBSEQUENT PRENATAL CARE: CPT | Performed by: OBSTETRICS & GYNECOLOGY

## 2024-08-10 ENCOUNTER — HOSPITAL ENCOUNTER (OUTPATIENT)
Facility: HOSPITAL | Age: 34
Discharge: HOME HEALTH | End: 2024-08-10
Attending: OBSTETRICS & GYNECOLOGY | Admitting: OBSTETRICS & GYNECOLOGY
Payer: COMMERCIAL

## 2024-08-10 VITALS
RESPIRATION RATE: 20 BRPM | BODY MASS INDEX: 31.28 KG/M2 | OXYGEN SATURATION: 98 % | TEMPERATURE: 98.1 F | HEIGHT: 62 IN | SYSTOLIC BLOOD PRESSURE: 142 MMHG | HEART RATE: 105 BPM | WEIGHT: 170 LBS | DIASTOLIC BLOOD PRESSURE: 73 MMHG

## 2024-08-10 PROBLEM — Z3A.36 36 WEEKS GESTATION OF PREGNANCY: Status: ACTIVE | Noted: 2024-08-10

## 2024-08-10 PROCEDURE — 99202 OFFICE O/P NEW SF 15 MIN: CPT

## 2024-08-10 NOTE — PROGRESS NOTES
1412-pt to l&D room 2 from home with c/o contractions since she lost her mucus plug this morning around 10am. Denies leaking, bleeding. Endorses fetal movement. Seen in office on 8/7 with dr ellison. 2cm. Terrence DM, med controlled.    1432-Dr Hamilton called. Pt condition and concerns reviewed with md. Strip and ve reviewed. Md to come see pt.     1437-Pt to be discharged home.     1448- discharge instructions reviewed with pt. Pt encouraged to increase hydration. Kick counts and when to call the md reviewed. Pt to follow up in office with dr ellison on Tuesday. Pt verbalizes understanding.     1452-pt discharged off unit, ambulatory,per pt request. Sig other by side

## 2024-08-10 NOTE — ED TRIAGE NOTES
found.    Other Studies:     Assessment:  Intrauterine pregnancy at 36 weeks/false labor  Stable for discharge  Rest/fluids  Precautions given  RTO 48 hrs/prn        HOSPITAL COURSE/PLAN:  Patient is seen and examined following current departmental COVID-19 protocols.  The patient and fetus are both monitored.  Both statuses appear stable and reassuring at this moment.    Outpatient follow-up appears appropriate at this time and with minimal risk of maternal or fetal deterioration.  Patient is to follow up with her provider or our [facility] if any issues recur or new issues arise including but lot limited to decreased fetal movements, pain or contractions, question of rupture of membranes, vaginal discharge or bleeding, or any other matter of concern.  Discharge condition of the patient and fetus at this time are both stable.        Yaya Chang MD  8/10/2024 2:42 PM    Future Appointments   Date Time Provider Department Center   8/13/2024 10:30 AM Alexander Jovel MD [facility]       I had one-on-one face-to-face contact with the patient at bedside.  This included discussion of their concerns, past pertinent related history, examination, discussion of their diagnosis and the importance of compliance with suggested treatment plan and followup care.     *Please note that M*Modal Fluency Direct / Dragon voice recognition software was used to dictate this note.  Dictation errors, grammatical errors, and incorrect pronouns may have occurred during the electronic transcription process. The medical provider for this chart reserves the right to correct aforementioned errors at a later time as necessary. If any clarifications are needed regarding the documentation of care provided to this patient, please contact the medical provider of this patient.     *If ultrasound is performed at bedside independent of the [facility] Radiology Department with L&D portable ultrasound unit, there is no capability for photos or video for

## 2024-08-12 ENCOUNTER — HOSPITAL ENCOUNTER (OUTPATIENT)
Facility: HOSPITAL | Age: 34
Discharge: HOME | End: 2024-08-12
Attending: OBSTETRICS & GYNECOLOGY | Admitting: OBSTETRICS & GYNECOLOGY
Payer: COMMERCIAL

## 2024-08-12 VITALS
DIASTOLIC BLOOD PRESSURE: 55 MMHG | BODY MASS INDEX: 31.28 KG/M2 | RESPIRATION RATE: 18 BRPM | SYSTOLIC BLOOD PRESSURE: 116 MMHG | TEMPERATURE: 98.1 F | WEIGHT: 170 LBS | HEIGHT: 62 IN | OXYGEN SATURATION: 95 % | HEART RATE: 71 BPM

## 2024-08-12 DIAGNOSIS — M54.41 ACUTE BILATERAL LOW BACK PAIN WITH RIGHT-SIDED SCIATICA: Primary | ICD-10-CM

## 2024-08-12 LAB
APPEARANCE UR: CLEAR
BACTERIA URNS QL MICRO: NEGATIVE /HPF
BILIRUB UR QL: NEGATIVE
COLOR UR: ABNORMAL
EPITH CASTS URNS QL MICRO: ABNORMAL /LPF
GLUCOSE UR STRIP.AUTO-MCNC: NEGATIVE MG/DL
HGB UR QL STRIP: NEGATIVE
KETONES UR QL STRIP.AUTO: 20 MG/DL
LEUKOCYTE ESTERASE UR QL STRIP.AUTO: NEGATIVE
MUCOUS THREADS URNS QL MICRO: ABNORMAL /LPF
NITRITE UR QL STRIP.AUTO: NEGATIVE
PH UR STRIP: 6 (ref 5–8)
PROT UR STRIP-MCNC: NEGATIVE MG/DL
RBC #/AREA URNS HPF: ABNORMAL /HPF (ref 0–5)
SP GR UR REFRACTOMETRY: 1.01 (ref 1–1.03)
UROBILINOGEN UR QL STRIP.AUTO: 0.1 EU/DL (ref 0.1–1)
WBC URNS QL MICRO: ABNORMAL /HPF (ref 0–4)

## 2024-08-12 PROCEDURE — 96361 HYDRATE IV INFUSION ADD-ON: CPT

## 2024-08-12 PROCEDURE — 96376 TX/PRO/DX INJ SAME DRUG ADON: CPT

## 2024-08-12 PROCEDURE — 2580000003 HC RX 258: Performed by: OBSTETRICS & GYNECOLOGY

## 2024-08-12 PROCEDURE — 6360000002 HC RX W HCPCS: Performed by: OBSTETRICS & GYNECOLOGY

## 2024-08-12 PROCEDURE — 81001 URINALYSIS AUTO W/SCOPE: CPT

## 2024-08-12 PROCEDURE — 96374 THER/PROPH/DIAG INJ IV PUSH: CPT

## 2024-08-12 PROCEDURE — 96360 HYDRATION IV INFUSION INIT: CPT

## 2024-08-12 PROCEDURE — 99213 OFFICE O/P EST LOW 20 MIN: CPT | Performed by: OBSTETRICS & GYNECOLOGY

## 2024-08-12 PROCEDURE — 99214 OFFICE O/P EST MOD 30 MIN: CPT

## 2024-08-12 RX ORDER — SODIUM CHLORIDE, SODIUM LACTATE, POTASSIUM CHLORIDE, AND CALCIUM CHLORIDE .6; .31; .03; .02 G/100ML; G/100ML; G/100ML; G/100ML
1000 INJECTION, SOLUTION INTRAVENOUS ONCE
Status: COMPLETED | OUTPATIENT
Start: 2024-08-12 | End: 2024-08-12

## 2024-08-12 RX ORDER — FENTANYL CITRATE 50 UG/ML
50 INJECTION, SOLUTION INTRAMUSCULAR; INTRAVENOUS
Status: DISCONTINUED | OUTPATIENT
Start: 2024-08-12 | End: 2024-08-12 | Stop reason: HOSPADM

## 2024-08-12 RX ORDER — OXYCODONE HYDROCHLORIDE AND ACETAMINOPHEN 5; 325 MG/1; MG/1
1 TABLET ORAL EVERY 6 HOURS PRN
Qty: 20 TABLET | Refills: 0 | Status: ON HOLD | OUTPATIENT
Start: 2024-08-12 | End: 2024-08-16 | Stop reason: HOSPADM

## 2024-08-12 RX ADMIN — FENTANYL CITRATE 50 MCG: 50 INJECTION, SOLUTION INTRAMUSCULAR; INTRAVENOUS at 06:31

## 2024-08-12 RX ADMIN — FENTANYL CITRATE 50 MCG: 50 INJECTION, SOLUTION INTRAMUSCULAR; INTRAVENOUS at 03:55

## 2024-08-12 RX ADMIN — SODIUM CHLORIDE, POTASSIUM CHLORIDE, SODIUM LACTATE AND CALCIUM CHLORIDE 1000 ML: 600; 310; 30; 20 INJECTION, SOLUTION INTRAVENOUS at 03:30

## 2024-08-12 ASSESSMENT — PAIN SCALES - GENERAL
PAINLEVEL_OUTOF10: 7
PAINLEVEL_OUTOF10: 6

## 2024-08-12 ASSESSMENT — PAIN DESCRIPTION - ORIENTATION: ORIENTATION: LOWER

## 2024-08-12 ASSESSMENT — PAIN DESCRIPTION - DESCRIPTORS
DESCRIPTORS: CRAMPING
DESCRIPTORS: CRAMPING;PRESSURE

## 2024-08-12 ASSESSMENT — PAIN - FUNCTIONAL ASSESSMENT
PAIN_FUNCTIONAL_ASSESSMENT: ACTIVITIES ARE NOT PREVENTED
PAIN_FUNCTIONAL_ASSESSMENT: ACTIVITIES ARE NOT PREVENTED

## 2024-08-12 ASSESSMENT — PAIN DESCRIPTION - LOCATION
LOCATION: ABDOMEN
LOCATION: ABDOMEN

## 2024-08-12 NOTE — PROGRESS NOTES
0800: Orders received from Dr. Jovel to discharge pt.    0816: Discharge instructions reviewed with pt and significant other. Questions encouraged and addressed. Pt verbalized understanding. Please see discharge summary for details. Pt declined use of wheelchair and ambulated off unit independently at this time, accompanied by s/o

## 2024-08-12 NOTE — H&P
History & Physical    Name: Marlene Caldwell MRN: [id number]  SSN: [id number]    YOB: 1990  Age: 34 y.o.  Sex: female        Subjective:     Estimated Date of Delivery: 24  OB History          7    Para   4    Term   4       0    AB   2    Living   4         SAB        IAB        Ectopic        Molar        Multiple        Live Births                    Ms. Caldwell is admitted with pregnancy at 36w5d for Increasingly painful contractions. Prenatal course was complicated by advanced maternal age. Please see prenatal records for details.    Past Medical History:   Diagnosis Date    Asthma     Gestational diabetes     Headache     Obesity      Past Surgical History:   Procedure Laterality Date    WISDOM TOOTH EXTRACTION       Social History     Occupational History    Not on file   Tobacco Use    Smoking status: Every Day     Current packs/day: 1.00     Types: Cigarettes     Passive exposure: Current    Smokeless tobacco: Never    Tobacco comments:     Patient refused.   Vaping Use    Vaping status: Never Used   Substance and Sexual Activity    Alcohol use: Not Currently    Drug use: Not Currently    Sexual activity: Yes     Partners: Male     Birth control/protection: None     Family History   Problem Relation Age of Onset    Breast Cancer Maternal Grandfather     Breast Cancer Paternal Aunt        No Known Allergies  Prior to Admission medications    Medication Sig Start Date End Date Taking? Authorizing Provider   glyBURIDE (DIABETA) 2.5 MG tablet Take 1 tablet by mouth 2 times daily (with meals) 24  Yes Alexander Jovel MD   blood glucose monitor strips Test 4 times a day & as needed for symptoms of irregular blood glucose. Dispense sufficient amount for indicated testing frequency plus additional to accommodate PRN testing needs. 24  Yes Alexander Jovel MD   Lancets MISC 1 each by Does not apply route 4 times daily 24  Yes Alexander Jovel MD   Prenatal Vit-Fe Fumarate-FA

## 2024-08-12 NOTE — PROGRESS NOTES
0730- BEDSIDE REPORT RECEIVED FROM AKASH KINGSTON RN     0750- DR HARRISON REVIEWED STRIP . IN TO SEE PATENT. PRIMARY NURSE TO BAHMAN GIMENEZ GIVEN REPORT ON PATIENT

## 2024-08-13 ENCOUNTER — ROUTINE PRENATAL (OUTPATIENT)
Age: 34
End: 2024-08-13

## 2024-08-13 VITALS
HEIGHT: 62 IN | BODY MASS INDEX: 31.94 KG/M2 | WEIGHT: 173.56 LBS | DIASTOLIC BLOOD PRESSURE: 72 MMHG | SYSTOLIC BLOOD PRESSURE: 124 MMHG

## 2024-08-13 DIAGNOSIS — Z3A.36 36 WEEKS GESTATION OF PREGNANCY: Primary | ICD-10-CM

## 2024-08-13 DIAGNOSIS — R73.03 PRE-DIABETES: ICD-10-CM

## 2024-08-13 PROCEDURE — 0502F SUBSEQUENT PRENATAL CARE: CPT | Performed by: OBSTETRICS & GYNECOLOGY

## 2024-08-13 NOTE — PROGRESS NOTES
Questions addressed  Notes less FM  DWP  FM counts BID- pt to reach back out if continues then will do IOL due to dec. FM and Pre-diabetes

## 2024-08-15 ENCOUNTER — TELEPHONE (OUTPATIENT)
Age: 34
End: 2024-08-15

## 2024-08-15 NOTE — TELEPHONE ENCOUNTER
Pt with continued drop in fetal movement  DWP - proceed with IOL due to dec. FM and GDM( elevated 1 hr GS)

## 2024-08-16 ENCOUNTER — HOSPITAL ENCOUNTER (INPATIENT)
Facility: HOSPITAL | Age: 34
LOS: 2 days | Discharge: HOME | End: 2024-08-18
Attending: OBSTETRICS & GYNECOLOGY | Admitting: OBSTETRICS & GYNECOLOGY
Payer: COMMERCIAL

## 2024-08-16 ENCOUNTER — ANESTHESIA EVENT (OUTPATIENT)
Facility: HOSPITAL | Age: 34
End: 2024-08-16
Payer: COMMERCIAL

## 2024-08-16 ENCOUNTER — APPOINTMENT (OUTPATIENT)
Facility: HOSPITAL | Age: 34
End: 2024-08-16
Payer: COMMERCIAL

## 2024-08-16 ENCOUNTER — ANESTHESIA (OUTPATIENT)
Facility: HOSPITAL | Age: 34
End: 2024-08-16
Payer: COMMERCIAL

## 2024-08-16 DIAGNOSIS — G89.18 POST-OPERATIVE PAIN: Primary | ICD-10-CM

## 2024-08-16 PROBLEM — R73.09 GTT (GLUCOSE TOLERANCE TEST) ABNORMAL: Status: ACTIVE | Noted: 2024-08-16

## 2024-08-16 PROBLEM — Z3A.37 37 WEEKS GESTATION OF PREGNANCY: Status: ACTIVE | Noted: 2024-08-16

## 2024-08-16 PROBLEM — O32.2XX0: Status: ACTIVE | Noted: 2024-08-16

## 2024-08-16 PROBLEM — R73.03 PRE-DIABETES: Status: RESOLVED | Noted: 2024-02-09 | Resolved: 2024-08-16

## 2024-08-16 PROBLEM — Z3A.36 36 WEEKS GESTATION OF PREGNANCY: Status: RESOLVED | Noted: 2024-08-10 | Resolved: 2024-08-16

## 2024-08-16 PROBLEM — O36.8130 DECREASED FETAL MOVEMENTS IN THIRD TRIMESTER: Status: ACTIVE | Noted: 2024-08-16

## 2024-08-16 PROBLEM — O32.2XX1 TRANSVERSE LIE OF FETUS, FETUS 1: Status: ACTIVE | Noted: 2024-08-16

## 2024-08-16 LAB
ABO + RH BLD: NORMAL
AMPHET UR QL SCN: NEGATIVE
BARBITURATES UR QL SCN: NEGATIVE
BASOPHILS # BLD: 0.1 K/UL (ref 0–0.1)
BASOPHILS NFR BLD: 1 % (ref 0–1)
BENZODIAZ UR QL: NEGATIVE
BLOOD GROUP ANTIBODIES SERPL: NEGATIVE
CANNABINOIDS UR QL SCN: NEGATIVE
COCAINE UR QL SCN: NEGATIVE
DIFFERENTIAL METHOD BLD: ABNORMAL
EOSINOPHIL # BLD: 0.2 K/UL (ref 0–0.4)
EOSINOPHIL NFR BLD: 2 % (ref 0–7)
ERYTHROCYTE [DISTWIDTH] IN BLOOD BY AUTOMATED COUNT: 13 % (ref 11.5–14.5)
HCT VFR BLD AUTO: 30.6 % (ref 35–47)
HGB BLD-MCNC: 10.6 G/DL (ref 11.5–16)
IMM GRANULOCYTES # BLD AUTO: 0.1 K/UL (ref 0–0.04)
IMM GRANULOCYTES NFR BLD AUTO: 1 % (ref 0–0.5)
LYMPHOCYTES # BLD: 3.3 K/UL (ref 0.8–3.5)
LYMPHOCYTES NFR BLD: 27 % (ref 12–49)
Lab: NORMAL
MCH RBC QN AUTO: 32.6 PG (ref 26–34)
MCHC RBC AUTO-ENTMCNC: 34.6 G/DL (ref 30–36.5)
MCV RBC AUTO: 94.2 FL (ref 80–99)
METHADONE UR QL: NEGATIVE
MONOCYTES # BLD: 0.6 K/UL (ref 0–1)
MONOCYTES NFR BLD: 5 % (ref 5–13)
NEUTS SEG # BLD: 7.8 K/UL (ref 1.8–8)
NEUTS SEG NFR BLD: 64 % (ref 32–75)
NRBC # BLD: 0 K/UL (ref 0–0.01)
NRBC BLD-RTO: 0 PER 100 WBC
OPIATES UR QL: NEGATIVE
PCP UR QL: NEGATIVE
PLATELET # BLD AUTO: 283 K/UL (ref 150–400)
PMV BLD AUTO: 10 FL (ref 8.9–12.9)
RBC # BLD AUTO: 3.25 M/UL (ref 3.8–5.2)
SPECIMEN EXP DATE BLD: NORMAL
WBC # BLD AUTO: 12 K/UL (ref 3.6–11)

## 2024-08-16 PROCEDURE — 86901 BLOOD TYPING SEROLOGIC RH(D): CPT

## 2024-08-16 PROCEDURE — 2500000003 HC RX 250 WO HCPCS

## 2024-08-16 PROCEDURE — 6360000002 HC RX W HCPCS: Performed by: OBSTETRICS & GYNECOLOGY

## 2024-08-16 PROCEDURE — 2500000003 HC RX 250 WO HCPCS: Performed by: NURSE ANESTHETIST, CERTIFIED REGISTERED

## 2024-08-16 PROCEDURE — 36415 COLL VENOUS BLD VENIPUNCTURE: CPT

## 2024-08-16 PROCEDURE — 3700000000 HC ANESTHESIA ATTENDED CARE: Performed by: OBSTETRICS & GYNECOLOGY

## 2024-08-16 PROCEDURE — 2709999900 HC NON-CHARGEABLE SUPPLY: Performed by: OBSTETRICS & GYNECOLOGY

## 2024-08-16 PROCEDURE — 80307 DRUG TEST PRSMV CHEM ANLYZR: CPT

## 2024-08-16 PROCEDURE — 2580000003 HC RX 258

## 2024-08-16 PROCEDURE — 6360000002 HC RX W HCPCS

## 2024-08-16 PROCEDURE — 3700000025 EPIDURAL BLOCK: Performed by: ANESTHESIOLOGY

## 2024-08-16 PROCEDURE — 2580000003 HC RX 258: Performed by: OBSTETRICS & GYNECOLOGY

## 2024-08-16 PROCEDURE — 74018 RADEX ABDOMEN 1 VIEW: CPT

## 2024-08-16 PROCEDURE — 85025 COMPLETE CBC W/AUTO DIFF WBC: CPT

## 2024-08-16 PROCEDURE — 6370000000 HC RX 637 (ALT 250 FOR IP): Performed by: OBSTETRICS & GYNECOLOGY

## 2024-08-16 PROCEDURE — 7100000000 HC PACU RECOVERY - FIRST 15 MIN: Performed by: OBSTETRICS & GYNECOLOGY

## 2024-08-16 PROCEDURE — 7100000001 HC PACU RECOVERY - ADDTL 15 MIN: Performed by: OBSTETRICS & GYNECOLOGY

## 2024-08-16 PROCEDURE — 3700000001 HC ADD 15 MINUTES (ANESTHESIA): Performed by: OBSTETRICS & GYNECOLOGY

## 2024-08-16 PROCEDURE — 1120000000 HC RM PRIVATE OB

## 2024-08-16 PROCEDURE — 3609079900 HC CESAREAN SECTION: Performed by: OBSTETRICS & GYNECOLOGY

## 2024-08-16 PROCEDURE — 86900 BLOOD TYPING SEROLOGIC ABO: CPT

## 2024-08-16 PROCEDURE — 6360000002 HC RX W HCPCS: Performed by: NURSE ANESTHETIST, CERTIFIED REGISTERED

## 2024-08-16 PROCEDURE — 86850 RBC ANTIBODY SCREEN: CPT

## 2024-08-16 PROCEDURE — 7210000100 HC LABOR FEE PER 1 HR

## 2024-08-16 RX ORDER — OXYTOCIN 10 [USP'U]/ML
INJECTION, SOLUTION INTRAMUSCULAR; INTRAVENOUS PRN
Status: DISCONTINUED | OUTPATIENT
Start: 2024-08-16 | End: 2024-08-16 | Stop reason: SDUPTHER

## 2024-08-16 RX ORDER — ROPIVACAINE HYDROCHLORIDE 2 MG/ML
INJECTION, SOLUTION EPIDURAL; INFILTRATION; PERINEURAL
Status: COMPLETED
Start: 2024-08-16 | End: 2024-08-16

## 2024-08-16 RX ORDER — SODIUM CHLORIDE 0.9 % (FLUSH) 0.9 %
5-40 SYRINGE (ML) INJECTION PRN
Status: DISCONTINUED | OUTPATIENT
Start: 2024-08-16 | End: 2024-08-16

## 2024-08-16 RX ORDER — PSEUDOEPHEDRINE HCL 30 MG
100 TABLET ORAL 2 TIMES DAILY
Qty: 60 CAPSULE | Refills: 0 | Status: SHIPPED | OUTPATIENT
Start: 2024-08-16

## 2024-08-16 RX ORDER — DEXAMETHASONE SODIUM PHOSPHATE 4 MG/ML
INJECTION, SOLUTION INTRA-ARTICULAR; INTRALESIONAL; INTRAMUSCULAR; INTRAVENOUS; SOFT TISSUE PRN
Status: DISCONTINUED | OUTPATIENT
Start: 2024-08-16 | End: 2024-08-16 | Stop reason: SDUPTHER

## 2024-08-16 RX ORDER — ACETAMINOPHEN 500 MG
1000 TABLET ORAL EVERY 8 HOURS SCHEDULED
Status: DISCONTINUED | OUTPATIENT
Start: 2024-08-16 | End: 2024-08-18 | Stop reason: HOSPADM

## 2024-08-16 RX ORDER — CARBOPROST TROMETHAMINE 250 UG/ML
250 INJECTION, SOLUTION INTRAMUSCULAR PRN
Status: DISCONTINUED | OUTPATIENT
Start: 2024-08-16 | End: 2024-08-18 | Stop reason: HOSPADM

## 2024-08-16 RX ORDER — MISOPROSTOL 200 UG/1
400 TABLET ORAL PRN
Status: DISCONTINUED | OUTPATIENT
Start: 2024-08-16 | End: 2024-08-18 | Stop reason: HOSPADM

## 2024-08-16 RX ORDER — ONDANSETRON 2 MG/ML
INJECTION INTRAMUSCULAR; INTRAVENOUS PRN
Status: DISCONTINUED | OUTPATIENT
Start: 2024-08-16 | End: 2024-08-16 | Stop reason: SDUPTHER

## 2024-08-16 RX ORDER — ONDANSETRON 2 MG/ML
4 INJECTION INTRAMUSCULAR; INTRAVENOUS EVERY 6 HOURS PRN
Status: DISCONTINUED | OUTPATIENT
Start: 2024-08-16 | End: 2024-08-16 | Stop reason: SDUPTHER

## 2024-08-16 RX ORDER — FENTANYL CITRATE 50 UG/ML
INJECTION, SOLUTION INTRAMUSCULAR; INTRAVENOUS PRN
Status: DISCONTINUED | OUTPATIENT
Start: 2024-08-16 | End: 2024-08-16 | Stop reason: SDUPTHER

## 2024-08-16 RX ORDER — DOCUSATE SODIUM 100 MG/1
100 CAPSULE, LIQUID FILLED ORAL 2 TIMES DAILY
Status: DISCONTINUED | OUTPATIENT
Start: 2024-08-16 | End: 2024-08-18 | Stop reason: HOSPADM

## 2024-08-16 RX ORDER — SODIUM CHLORIDE 9 MG/ML
INJECTION, SOLUTION INTRAVENOUS PRN
Status: DISCONTINUED | OUTPATIENT
Start: 2024-08-16 | End: 2024-08-18 | Stop reason: HOSPADM

## 2024-08-16 RX ORDER — LIDOCAINE HCL/EPINEPHRINE/PF 2%-1:200K
VIAL (ML) INJECTION PRN
Status: DISCONTINUED | OUTPATIENT
Start: 2024-08-16 | End: 2024-08-16 | Stop reason: SDUPTHER

## 2024-08-16 RX ORDER — OXYCODONE HYDROCHLORIDE 5 MG/1
5 TABLET ORAL EVERY 4 HOURS PRN
Status: DISCONTINUED | OUTPATIENT
Start: 2024-08-16 | End: 2024-08-18 | Stop reason: HOSPADM

## 2024-08-16 RX ORDER — MODIFIED LANOLIN
OINTMENT (GRAM) TOPICAL
Status: DISCONTINUED | OUTPATIENT
Start: 2024-08-16 | End: 2024-08-18 | Stop reason: HOSPADM

## 2024-08-16 RX ORDER — LIDOCAINE HYDROCHLORIDE 20 MG/ML
INJECTION, SOLUTION EPIDURAL; INFILTRATION; INTRACAUDAL; PERINEURAL PRN
Status: DISCONTINUED | OUTPATIENT
Start: 2024-08-16 | End: 2024-08-16 | Stop reason: SDUPTHER

## 2024-08-16 RX ORDER — SODIUM CHLORIDE 0.9 % (FLUSH) 0.9 %
5-40 SYRINGE (ML) INJECTION EVERY 12 HOURS SCHEDULED
Status: DISCONTINUED | OUTPATIENT
Start: 2024-08-16 | End: 2024-08-18 | Stop reason: HOSPADM

## 2024-08-16 RX ORDER — KETOROLAC TROMETHAMINE 30 MG/ML
30 INJECTION, SOLUTION INTRAMUSCULAR; INTRAVENOUS EVERY 6 HOURS
Status: COMPLETED | OUTPATIENT
Start: 2024-08-16 | End: 2024-08-17

## 2024-08-16 RX ORDER — SODIUM CHLORIDE 0.9 % (FLUSH) 0.9 %
5-40 SYRINGE (ML) INJECTION PRN
Status: DISCONTINUED | OUTPATIENT
Start: 2024-08-16 | End: 2024-08-18 | Stop reason: HOSPADM

## 2024-08-16 RX ORDER — OXYCODONE HYDROCHLORIDE 10 MG/1
10 TABLET ORAL EVERY 4 HOURS PRN
Status: DISCONTINUED | OUTPATIENT
Start: 2024-08-16 | End: 2024-08-18 | Stop reason: HOSPADM

## 2024-08-16 RX ORDER — FENTANYL/BUPIVACAINE/NS/PF 2-1250MCG
PLASTIC BAG, INJECTION (ML) INJECTION
Status: COMPLETED
Start: 2024-08-16 | End: 2024-08-16

## 2024-08-16 RX ORDER — SODIUM CHLORIDE, SODIUM LACTATE, POTASSIUM CHLORIDE, CALCIUM CHLORIDE 600; 310; 30; 20 MG/100ML; MG/100ML; MG/100ML; MG/100ML
INJECTION, SOLUTION INTRAVENOUS CONTINUOUS
Status: DISCONTINUED | OUTPATIENT
Start: 2024-08-16 | End: 2024-08-18 | Stop reason: HOSPADM

## 2024-08-16 RX ORDER — ONDANSETRON 2 MG/ML
4 INJECTION INTRAMUSCULAR; INTRAVENOUS EVERY 6 HOURS PRN
Status: DISCONTINUED | OUTPATIENT
Start: 2024-08-16 | End: 2024-08-18 | Stop reason: HOSPADM

## 2024-08-16 RX ORDER — ONDANSETRON 2 MG/ML
4 INJECTION INTRAMUSCULAR; INTRAVENOUS EVERY 6 HOURS PRN
Status: DISCONTINUED | OUTPATIENT
Start: 2024-08-16 | End: 2024-08-16

## 2024-08-16 RX ORDER — NALBUPHINE HYDROCHLORIDE 10 MG/ML
2.5 INJECTION, SOLUTION INTRAMUSCULAR; INTRAVENOUS; SUBCUTANEOUS EVERY 4 HOURS PRN
Status: DISCONTINUED | OUTPATIENT
Start: 2024-08-16 | End: 2024-08-16

## 2024-08-16 RX ORDER — SODIUM CHLORIDE, SODIUM LACTATE, POTASSIUM CHLORIDE, CALCIUM CHLORIDE 600; 310; 30; 20 MG/100ML; MG/100ML; MG/100ML; MG/100ML
INJECTION, SOLUTION INTRAVENOUS CONTINUOUS
Status: DISCONTINUED | OUTPATIENT
Start: 2024-08-16 | End: 2024-08-16

## 2024-08-16 RX ORDER — METHYLERGONOVINE MALEATE 0.2 MG/ML
200 INJECTION INTRAVENOUS PRN
Status: DISCONTINUED | OUTPATIENT
Start: 2024-08-16 | End: 2024-08-18 | Stop reason: HOSPADM

## 2024-08-16 RX ORDER — FENTANYL CITRATE 50 UG/ML
50 INJECTION, SOLUTION INTRAMUSCULAR; INTRAVENOUS
Status: DISCONTINUED | OUTPATIENT
Start: 2024-08-16 | End: 2024-08-16

## 2024-08-16 RX ORDER — KETOROLAC TROMETHAMINE 30 MG/ML
INJECTION, SOLUTION INTRAMUSCULAR; INTRAVENOUS PRN
Status: DISCONTINUED | OUTPATIENT
Start: 2024-08-16 | End: 2024-08-16 | Stop reason: SDUPTHER

## 2024-08-16 RX ORDER — ONDANSETRON 4 MG/1
4 TABLET, ORALLY DISINTEGRATING ORAL EVERY 6 HOURS PRN
Status: DISCONTINUED | OUTPATIENT
Start: 2024-08-16 | End: 2024-08-16

## 2024-08-16 RX ORDER — TERBUTALINE SULFATE 1 MG/ML
0.25 INJECTION, SOLUTION SUBCUTANEOUS
Status: DISCONTINUED | OUTPATIENT
Start: 2024-08-16 | End: 2024-08-16

## 2024-08-16 RX ORDER — SODIUM CHLORIDE 9 MG/ML
25 INJECTION, SOLUTION INTRAVENOUS PRN
Status: DISCONTINUED | OUTPATIENT
Start: 2024-08-16 | End: 2024-08-16

## 2024-08-16 RX ORDER — MORPHINE SULFATE 4 MG/ML
4 INJECTION, SOLUTION INTRAMUSCULAR; INTRAVENOUS
Status: DISCONTINUED | OUTPATIENT
Start: 2024-08-16 | End: 2024-08-18 | Stop reason: HOSPADM

## 2024-08-16 RX ORDER — MORPHINE SULFATE 2 MG/ML
2 INJECTION, SOLUTION INTRAMUSCULAR; INTRAVENOUS
Status: DISCONTINUED | OUTPATIENT
Start: 2024-08-16 | End: 2024-08-18 | Stop reason: HOSPADM

## 2024-08-16 RX ORDER — NALOXONE HYDROCHLORIDE 0.4 MG/ML
INJECTION, SOLUTION INTRAMUSCULAR; INTRAVENOUS; SUBCUTANEOUS PRN
Status: DISCONTINUED | OUTPATIENT
Start: 2024-08-16 | End: 2024-08-16

## 2024-08-16 RX ORDER — SODIUM CHLORIDE 0.9 % (FLUSH) 0.9 %
5-40 SYRINGE (ML) INJECTION EVERY 12 HOURS SCHEDULED
Status: DISCONTINUED | OUTPATIENT
Start: 2024-08-16 | End: 2024-08-16

## 2024-08-16 RX ORDER — IBUPROFEN 800 MG/1
800 TABLET ORAL EVERY 8 HOURS
Status: DISCONTINUED | OUTPATIENT
Start: 2024-08-17 | End: 2024-08-18 | Stop reason: HOSPADM

## 2024-08-16 RX ORDER — IBUPROFEN 800 MG/1
800 TABLET ORAL EVERY 8 HOURS
Qty: 60 TABLET | Refills: 0 | Status: SHIPPED | OUTPATIENT
Start: 2024-08-17

## 2024-08-16 RX ORDER — ONDANSETRON 4 MG/1
4 TABLET, ORALLY DISINTEGRATING ORAL EVERY 8 HOURS PRN
Status: DISCONTINUED | OUTPATIENT
Start: 2024-08-16 | End: 2024-08-18 | Stop reason: HOSPADM

## 2024-08-16 RX ORDER — ACETAMINOPHEN 325 MG/1
650 TABLET ORAL EVERY 4 HOURS PRN
Status: DISCONTINUED | OUTPATIENT
Start: 2024-08-16 | End: 2024-08-16

## 2024-08-16 RX ORDER — SODIUM CHLORIDE, SODIUM LACTATE, POTASSIUM CHLORIDE, CALCIUM CHLORIDE 600; 310; 30; 20 MG/100ML; MG/100ML; MG/100ML; MG/100ML
INJECTION, SOLUTION INTRAVENOUS CONTINUOUS PRN
Status: DISCONTINUED | OUTPATIENT
Start: 2024-08-16 | End: 2024-08-16 | Stop reason: SDUPTHER

## 2024-08-16 RX ORDER — FENTANYL/BUPIVACAINE/NS/PF 2-1250MCG
10 PLASTIC BAG, INJECTION (ML) INJECTION CONTINUOUS
Status: DISCONTINUED | OUTPATIENT
Start: 2024-08-16 | End: 2024-08-16

## 2024-08-16 RX ORDER — SODIUM CHLORIDE, SODIUM LACTATE, POTASSIUM CHLORIDE, AND CALCIUM CHLORIDE .6; .31; .03; .02 G/100ML; G/100ML; G/100ML; G/100ML
500 INJECTION, SOLUTION INTRAVENOUS PRN
Status: DISCONTINUED | OUTPATIENT
Start: 2024-08-16 | End: 2024-08-16

## 2024-08-16 RX ORDER — OXYCODONE HYDROCHLORIDE 5 MG/1
5 TABLET ORAL EVERY 6 HOURS PRN
Qty: 28 TABLET | Refills: 0 | Status: SHIPPED | OUTPATIENT
Start: 2024-08-16 | End: 2024-08-23

## 2024-08-16 RX ORDER — ROPIVACAINE HYDROCHLORIDE 2 MG/ML
INJECTION, SOLUTION EPIDURAL; INFILTRATION; PERINEURAL PRN
Status: DISCONTINUED | OUTPATIENT
Start: 2024-08-16 | End: 2024-08-16 | Stop reason: SDUPTHER

## 2024-08-16 RX ADMIN — OXYTOCIN 20 UNITS: 10 INJECTION, SOLUTION INTRAMUSCULAR; INTRAVENOUS at 13:50

## 2024-08-16 RX ADMIN — SODIUM CHLORIDE, POTASSIUM CHLORIDE, SODIUM LACTATE AND CALCIUM CHLORIDE: 600; 310; 30; 20 INJECTION, SOLUTION INTRAVENOUS at 13:55

## 2024-08-16 RX ADMIN — SODIUM CHLORIDE, POTASSIUM CHLORIDE, SODIUM LACTATE AND CALCIUM CHLORIDE: 600; 310; 30; 20 INJECTION, SOLUTION INTRAVENOUS at 06:13

## 2024-08-16 RX ADMIN — MORPHINE SULFATE 4 MG: 4 INJECTION, SOLUTION INTRAMUSCULAR; INTRAVENOUS at 15:15

## 2024-08-16 RX ADMIN — FENTANYL CITRATE 50 MCG: 50 INJECTION INTRAMUSCULAR; INTRAVENOUS at 13:54

## 2024-08-16 RX ADMIN — SODIUM CHLORIDE, POTASSIUM CHLORIDE, SODIUM LACTATE AND CALCIUM CHLORIDE: 600; 310; 30; 20 INJECTION, SOLUTION INTRAVENOUS at 14:00

## 2024-08-16 RX ADMIN — FENTANYL CITRATE 50 MCG: 50 INJECTION INTRAMUSCULAR; INTRAVENOUS at 13:52

## 2024-08-16 RX ADMIN — Medication: at 16:58

## 2024-08-16 RX ADMIN — SODIUM CHLORIDE, PRESERVATIVE FREE 10 ML: 5 INJECTION INTRAVENOUS at 21:46

## 2024-08-16 RX ADMIN — ONDANSETRON 8 MG: 2 INJECTION INTRAMUSCULAR; INTRAVENOUS at 13:53

## 2024-08-16 RX ADMIN — FENTANYL CITRATE 50 MCG: 50 INJECTION, SOLUTION INTRAMUSCULAR; INTRAVENOUS at 09:28

## 2024-08-16 RX ADMIN — LIDOCAINE HYDROCHLORIDE 10 ML: 20 SOLUTION INTRAVENOUS at 13:43

## 2024-08-16 RX ADMIN — SODIUM CHLORIDE, POTASSIUM CHLORIDE, SODIUM LACTATE AND CALCIUM CHLORIDE: 600; 310; 30; 20 INJECTION, SOLUTION INTRAVENOUS at 19:08

## 2024-08-16 RX ADMIN — KETOROLAC TROMETHAMINE 30 MG: 30 INJECTION INTRAMUSCULAR; INTRAVENOUS at 21:46

## 2024-08-16 RX ADMIN — KETOROLAC TROMETHAMINE 30 MG: 30 INJECTION, SOLUTION INTRAMUSCULAR at 13:53

## 2024-08-16 RX ADMIN — CEFAZOLIN 2000 MG: 1 INJECTION, POWDER, FOR SOLUTION INTRAMUSCULAR; INTRAVENOUS at 13:47

## 2024-08-16 RX ADMIN — DOCUSATE SODIUM 100 MG: 100 CAPSULE, LIQUID FILLED ORAL at 21:46

## 2024-08-16 RX ADMIN — Medication 4 MILLI-UNITS/MIN: at 08:09

## 2024-08-16 RX ADMIN — SODIUM BICARBONATE 2 MEQ: 84 INJECTION, SOLUTION INTRAVENOUS at 13:43

## 2024-08-16 RX ADMIN — ACETAMINOPHEN 1000 MG: 500 TABLET ORAL at 18:09

## 2024-08-16 RX ADMIN — Medication 10 ML/HR: at 10:48

## 2024-08-16 RX ADMIN — DEXAMETHASONE SODIUM PHOSPHATE 8 MG: 4 INJECTION, SOLUTION INTRA-ARTICULAR; INTRALESIONAL; INTRAMUSCULAR; INTRAVENOUS; SOFT TISSUE at 13:53

## 2024-08-16 RX ADMIN — LIDOCAINE HYDROCHLORIDE,EPINEPHRINE BITARTRATE 3 ML: 20; .005 INJECTION, SOLUTION EPIDURAL; INFILTRATION; INTRACAUDAL; PERINEURAL at 10:23

## 2024-08-16 RX ADMIN — SODIUM CHLORIDE, POTASSIUM CHLORIDE, SODIUM LACTATE AND CALCIUM CHLORIDE: 600; 310; 30; 20 INJECTION, SOLUTION INTRAVENOUS at 09:59

## 2024-08-16 RX ADMIN — ROPIVACAINE HYDROCHLORIDE 8 ML: 2 INJECTION EPIDURAL; INFILTRATION; PERINEURAL at 10:27

## 2024-08-16 ASSESSMENT — PAIN DESCRIPTION - LOCATION: LOCATION: ABDOMEN

## 2024-08-16 ASSESSMENT — PAIN SCALES - GENERAL: PAINLEVEL_OUTOF10: 8

## 2024-08-16 NOTE — ANESTHESIA PROCEDURE NOTES
Epidural Block    Patient location during procedure: OB  Start time: 8/16/2024 10:00 AM  End time: 8/16/2024 10:23 AM  Reason for block: labor epidural  Staffing  Performed: other anesthesia staff   Anesthesiologist: Priyank Ortiz MD  Resident/CRNA: Jamal Morales APRN - CRNA  Other anesthesia staff: Ashvin Edwards RN  Performed by: Jamal Morales APRN - CRNA  Authorized by: Priyank Ortiz MD    Epidural  Patient position: sitting  Prep: ChloraPrep and site prepped and draped  Patient monitoring: continuous pulse ox and frequent blood pressure checks  Approach: midline  Location: L2-3  Injection technique: AGUS saline  Provider prep: mask and sterile gloves  Needle  Needle type: Tuohy   Needle gauge: 17 G  Needle length: 3.5 in  Needle insertion depth: 5.5 cm  Catheter type: multi-orifice  Catheter size: 19 G  Catheter at skin depth: 11 cm  Test dose: negativeCatheter Secured: tegaderm and tape  Assessment  Sensory level: T6  Hemodynamics: stable  Attempts: 1  Outcomes: uncomplicated and patient tolerated procedure well  Preanesthetic Checklist  Completed: patient identified, IV checked, site marked, risks and benefits discussed, surgical/procedural consents, equipment checked, pre-op evaluation, timeout performed, anesthesia consent given, oxygen available, monitors applied/VS acknowledged, fire risk safety assessment completed and verbalized and blood product R/B/A discussed and consented

## 2024-08-16 NOTE — PROGRESS NOTES
0723- Bedside change of shift report received from AKASH Moss RN.    0744- Dr. Jovel at bedside. MD performed SVE, 3. MD unable to perform AROM at this time.     809- IV pitocin initiated.    918- Epidural IVF bolus started.    957- CRNA at bedside. Pt sitting on side of bed for epidural placement.    1001- Epidural time out performed.    1022- Epidural catheter placed successfully. CRNA administered test dose at this time.    1042- Best catheter inserted under sterile technique, urine return noted. SVE performed, /-3.     1305- Dr. Jovel at bedside and performed SVE, /-3. AROM performed, moderate amount of clear fluid noted.    1310- Dr. Jovel performing bedside ultrasound, baby transverse. Pt repositioned into trendelenburg to encourage engagement of fetal head into pelvis    1330- Decision made for .    1341- Pt entered OR for .    1406- Initial OR count unable to be performed prior to start of . Xray ordered and called for stat xray.    1419- Xray tech in OR and performed abdominal xray, nothing retained visualized.    1431- Pt left OR and wheeled back to L&D room for postop  recovery.    1540- Pt c/o pain 10/10, 30 mins after receiving IV morphine. MD notified. PCA pump ordered.    165- Dilaudid PCA pump initiated and verified w/ second RN.    1745- Pt transferred to . Bedside transfer of care report given to MORRIS Shabazz RN.

## 2024-08-16 NOTE — H&P
History and Physical    Patient: Marlene Caldwell MRN: [id number]  SSN: [id number]    YOB: 1990  Age: 34 y.o.  Sex: female      Subjective:      Marlene Caldwell is a 34 y.o. female at 37.2 weeks presents for IOL due to dec. Fetal movement and elevated GS    Past Medical History:   Diagnosis Date    Asthma     Gestational diabetes     Headache     Obesity      Past Surgical History:   Procedure Laterality Date    WISDOM TOOTH EXTRACTION        Family History   Problem Relation Age of Onset    Breast Cancer Maternal Grandfather     Breast Cancer Paternal Aunt      Social History     Tobacco Use    Smoking status: Every Day     Current packs/day: 1.00     Types: Cigarettes     Passive exposure: Current    Smokeless tobacco: Never    Tobacco comments:     Patient refused.   Substance Use Topics    Alcohol use: Not Currently      Prior to Admission medications    Medication Sig Start Date End Date Taking? Authorizing Provider   glyBURIDE (DIABETA) 2.5 MG tablet Take 1 tablet by mouth 2 times daily (with meals) 6/27/24  Yes Alexander Jovel MD   blood glucose monitor strips Test 4 times a day & as needed for symptoms of irregular blood glucose. Dispense sufficient amount for indicated testing frequency plus additional to accommodate PRN testing needs. 6/17/24  Yes Alexander Jovel MD   Lancets MISC 1 each by Does not apply route 4 times daily 6/17/24  Yes Alexander Jovel MD   Prenatal Vit-Fe Fumarate-FA (PRENATAL PO) Take by mouth   Yes ProviderMili MD   oxyCODONE-acetaminophen (PERCOCET) 5-325 MG per tablet Take 1 tablet by mouth every 6 hours as needed for Pain for up to 5 days. Intended supply: 5 days. Take lowest dose possible to manage pain Max Daily Amount: 4 tablets  Patient not taking: Reported on 8/16/2024 8/12/24 8/17/24  Alexander Jovel MD        No Known Allergies    Review of Systems:  A comprehensive review of systems was negative except for that written in the History of Present

## 2024-08-16 NOTE — PROGRESS NOTES
Progress Note    Patient: Marlene Caldwell MRN: [id number]  SSN: [id number]    YOB: 1990  Age: 34 y.o.  Sex: female      Admit Date: 8/16/2024    LOS: 0 days     Subjective:     No Complaints, Pt got epidural, on exam by staff 4 cms    Objective:     Vitals:    08/16/24 1502 08/16/24 1507 08/16/24 1512 08/16/24 1517   BP:   (!) 115/58    Pulse: 65 63 63    Resp:       Temp:       TempSrc:       SpO2: 100% 100% 100% 100%   Weight:       Height:            Intake and Output:  Current Shift: 08/16 0701 - 08/16 1900  In: 2713.5 [I.V.:2713.5]  Out: -   Last three shifts: No intake/output data recorded.    Physical Exam:   Abd:Gravid  Strip reactive  Cx: 50/4/-3/Vtx  AROM--> clear and a significant amount    Felt like fetal head was over to the pt's right  Bedside US done and confirmed  Attempted T-Tyler and placing peanut to see if fetus would change position- while getting peanut FHT noted to be midline just around or below umbilicus, then FHT seemed to move, Exam done no cord prolapse noted, felt like head was still to pt's right, US confirmed FHT in the lower left quadrant    Given movement of infant and the fact it seemed to be moving to a transverse position, a LTCS was called- Anest., staff and nursery notified    Lab/Data Review:  Recent Results (from the past 12 hour(s))   CBC with Auto Differential    Collection Time: 08/16/24  6:00 AM   Result Value Ref Range    WBC 12.0 (H) 3.6 - 11.0 K/uL    RBC 3.25 (L) 3.80 - 5.20 M/uL    Hemoglobin 10.6 (L) 11.5 - 16.0 g/dL    Hematocrit 30.6 (L) 35.0 - 47.0 %    MCV 94.2 80.0 - 99.0 FL    MCH 32.6 26.0 - 34.0 PG    MCHC 34.6 30.0 - 36.5 g/dL    RDW 13.0 11.5 - 14.5 %    Platelets 283 150 - 400 K/uL    MPV 10.0 8.9 - 12.9 FL    Nucleated RBCs 0.0 0.0  WBC    nRBC 0.00 0.00 - 0.01 K/uL    Neutrophils % 64 32 - 75 %    Lymphocytes % 27 12 - 49 %    Monocytes % 5 5 - 13 %    Eosinophils % 2 0 - 7 %    Basophils % 1 0 - 1 %    Immature Granulocytes % 1 (H)

## 2024-08-16 NOTE — ANESTHESIA PRE PROCEDURE
05:13 PM       POC Tests: No results for input(s): \"POCGLU\", \"POCNA\", \"POCK\", \"POCCL\", \"POCBUN\", \"POCHEMO\", \"POCHCT\" in the last 72 hours.    Coags: No results found for: \"PROTIME\", \"INR\", \"APTT\"    HCG (If Applicable):   Lab Results   Component Value Date    HCGQUANT 23,784 (H) 02/10/2024        ABGs: No results found for: \"PHART\", \"PO2ART\", \"NIL6FTA\", \"ALZ8FWS\", \"BEART\", \"Y6KMBWIQ\"     Type & Screen (If Applicable):  Lab Results   Component Value Date    LABABO O 02/09/2024       Drug/Infectious Status (If Applicable):  Lab Results   Component Value Date/Time    HEPCAB Non Reactive 02/09/2024 09:50 AM       COVID-19 Screening (If Applicable): No results found for: \"COVID19\"        Anesthesia Evaluation    Airway: Mallampati: I  TM distance: >3 FB   Neck ROM: full  Mouth opening: > = 3 FB   Dental: normal exam         Pulmonary:normal exam    (+)           asthma:                            Cardiovascular:                      Neuro/Psych:   (+) headaches:            GI/Hepatic/Renal:             Endo/Other:    (+) Diabetes.                 Abdominal: normal exam             PE comment: Pregnancy   Vascular:          Other Findings:       Anesthesia Plan      epidural     ASA 2             Anesthetic plan and risks discussed with patient.    Use of blood products discussed with patient whom consented to blood products.                  REID Rodriguez - CRNA   8/16/2024

## 2024-08-16 NOTE — PROCEDURES
PROCEDURE NOTE  Date: 8/16/2024   Name: Marlene Caldwell  YOB: 1990      Operative Report    Patient: Marlene Caldwell MRN: [id number]  SSN: [id number]    YOB: 1990  Age: 34 y.o.  Sex: female       Date of Surgery: 8/16/24    Preoperative Diagnosis: 37 week pregnancy,abnormal glucose screen, Fetal intolerance of labor, transverse lie    Postoperative Diagnosis: same    Surgeons and Role:     * Alexander Jovel MD - Primary    Anesthesia: Epidural    Procedure: LTCS    Estimated Blood Loss:  see nursing notes    Complications: None    Drains: Best- clear    Specimens: Cord blood       Surgical Findings: Normal Uterus tubes and ovaries, Nuchal cord x 1, arm presentation, LBI delivered, 3V Cord, Nl Placenta, Abxs Given, Cord Blood drawn, Peds and Resp present at delivery    Counts: Sponge and needle counts were correct times two.    Signed By:  Alexander Jovel MD     August 16, 2024

## 2024-08-17 LAB
ERYTHROCYTE [DISTWIDTH] IN BLOOD BY AUTOMATED COUNT: 13.1 % (ref 11.5–14.5)
GLUCOSE BLD STRIP.AUTO-MCNC: 115 MG/DL (ref 65–100)
HCT VFR BLD AUTO: 25.4 % (ref 35–47)
HGB BLD-MCNC: 8.7 G/DL (ref 11.5–16)
MCH RBC QN AUTO: 32.2 PG (ref 26–34)
MCHC RBC AUTO-ENTMCNC: 34.3 G/DL (ref 30–36.5)
MCV RBC AUTO: 94.1 FL (ref 80–99)
NRBC # BLD: 0 K/UL (ref 0–0.01)
NRBC BLD-RTO: 0 PER 100 WBC
PERFORMED BY:: ABNORMAL
PLATELET # BLD AUTO: 249 K/UL (ref 150–400)
PMV BLD AUTO: 10 FL (ref 8.9–12.9)
RBC # BLD AUTO: 2.7 M/UL (ref 3.8–5.2)
WBC # BLD AUTO: 15.1 K/UL (ref 3.6–11)

## 2024-08-17 PROCEDURE — 2580000003 HC RX 258: Performed by: OBSTETRICS & GYNECOLOGY

## 2024-08-17 PROCEDURE — 36415 COLL VENOUS BLD VENIPUNCTURE: CPT

## 2024-08-17 PROCEDURE — 6360000002 HC RX W HCPCS: Performed by: OBSTETRICS & GYNECOLOGY

## 2024-08-17 PROCEDURE — 82962 GLUCOSE BLOOD TEST: CPT

## 2024-08-17 PROCEDURE — 1120000000 HC RM PRIVATE OB

## 2024-08-17 PROCEDURE — 6370000000 HC RX 637 (ALT 250 FOR IP): Performed by: OBSTETRICS & GYNECOLOGY

## 2024-08-17 PROCEDURE — 85027 COMPLETE CBC AUTOMATED: CPT

## 2024-08-17 RX ORDER — CALCIUM CARBONATE 500 MG/1
500 TABLET, CHEWABLE ORAL 3 TIMES DAILY PRN
Status: DISCONTINUED | OUTPATIENT
Start: 2024-08-17 | End: 2024-08-18 | Stop reason: HOSPADM

## 2024-08-17 RX ADMIN — CALCIUM CARBONATE 500 MG: 500 TABLET, CHEWABLE ORAL at 13:55

## 2024-08-17 RX ADMIN — OXYCODONE HYDROCHLORIDE 5 MG: 5 TABLET ORAL at 06:08

## 2024-08-17 RX ADMIN — DOCUSATE SODIUM 100 MG: 100 CAPSULE, LIQUID FILLED ORAL at 09:21

## 2024-08-17 RX ADMIN — KETOROLAC TROMETHAMINE 30 MG: 30 INJECTION INTRAMUSCULAR; INTRAVENOUS at 03:27

## 2024-08-17 RX ADMIN — ACETAMINOPHEN 1000 MG: 500 TABLET ORAL at 09:21

## 2024-08-17 RX ADMIN — OXYCODONE HYDROCHLORIDE 5 MG: 5 TABLET ORAL at 20:14

## 2024-08-17 RX ADMIN — SODIUM CHLORIDE, PRESERVATIVE FREE 10 ML: 5 INJECTION INTRAVENOUS at 03:28

## 2024-08-17 RX ADMIN — OXYCODONE HYDROCHLORIDE 10 MG: 10 TABLET ORAL at 16:03

## 2024-08-17 RX ADMIN — KETOROLAC TROMETHAMINE 30 MG: 30 INJECTION INTRAMUSCULAR; INTRAVENOUS at 09:21

## 2024-08-17 RX ADMIN — DOCUSATE SODIUM 100 MG: 100 CAPSULE, LIQUID FILLED ORAL at 22:24

## 2024-08-17 RX ADMIN — KETOROLAC TROMETHAMINE 30 MG: 30 INJECTION INTRAMUSCULAR; INTRAVENOUS at 13:55

## 2024-08-17 RX ADMIN — OXYCODONE HYDROCHLORIDE 10 MG: 10 TABLET ORAL at 11:53

## 2024-08-17 RX ADMIN — ACETAMINOPHEN 1000 MG: 500 TABLET ORAL at 16:03

## 2024-08-17 RX ADMIN — ACETAMINOPHEN 1000 MG: 500 TABLET ORAL at 00:48

## 2024-08-17 RX ADMIN — SODIUM CHLORIDE, POTASSIUM CHLORIDE, SODIUM LACTATE AND CALCIUM CHLORIDE: 600; 310; 30; 20 INJECTION, SOLUTION INTRAVENOUS at 02:59

## 2024-08-17 ASSESSMENT — PAIN SCALES - GENERAL
PAINLEVEL_OUTOF10: 3
PAINLEVEL_OUTOF10: 6
PAINLEVEL_OUTOF10: 5
PAINLEVEL_OUTOF10: 7
PAINLEVEL_OUTOF10: 7
PAINLEVEL_OUTOF10: 4

## 2024-08-17 ASSESSMENT — PAIN DESCRIPTION - DESCRIPTORS
DESCRIPTORS: SORE
DESCRIPTORS: SORE
DESCRIPTORS: ACHING
DESCRIPTORS: SORE
DESCRIPTORS: ACHING

## 2024-08-17 ASSESSMENT — PAIN DESCRIPTION - ORIENTATION
ORIENTATION: LOWER

## 2024-08-17 ASSESSMENT — PAIN DESCRIPTION - LOCATION
LOCATION: ABDOMEN
LOCATION: ABDOMEN;INCISION

## 2024-08-17 ASSESSMENT — PAIN - FUNCTIONAL ASSESSMENT
PAIN_FUNCTIONAL_ASSESSMENT: ACTIVITIES ARE NOT PREVENTED

## 2024-08-17 NOTE — PROGRESS NOTES
Progress Note    Patient: Marlene Caldwell MRN: [id number]  SSN: [id number]    YOB: 1990  Age: 34 y.o.  Sex: female      Admit Date: 8/16/2024    LOS: 1 day     Subjective:     No Complaints    Objective:     Vitals:    08/17/24 0354 08/17/24 0437 08/17/24 0608 08/17/24 0735   BP:    (!) 115/54   Pulse: 62 67  63   Resp:   18 18   Temp:    98.5 °F (36.9 °C)   TempSrc:    Oral   SpO2: 98% 99%  98%   Weight:       Height:            Intake and Output:  Current Shift: 08/17 0701 - 08/17 1900  In: -   Out: 300 [Urine:300]  Last three shifts: 08/15 1901 - 08/17 0700  In: 4280.7 [I.V.:4280.7]  Out: 2086 [Urine:1550]    Physical Exam:   Abd:  FF  INC: CDI    Lab/Data Review:  All lab results for the last 24 hours reviewed.    Recent Results (from the past 12 hour(s))   POCT Glucose    Collection Time: 08/17/24  3:31 AM   Result Value Ref Range    POC Glucose 115 (H) 65 - 100 mg/dL    Performed by: Lev Ulrich            Assessment:     Patient Active Problem List   Diagnosis    37 weeks gestation of pregnancy    GTT (glucose tolerance test) abnormal    Decreased fetal movements in third trimester    Transverse lie of fetus, fetus 1    Prolapsed arm presentation        Plan:     Routine PP/PO orders: PPD/POD 3 1 Emergent LTCS    Signed By: Alexander Jovel MD     August 17, 2024

## 2024-08-17 NOTE — OP NOTE
[address]  [address]                            OPERATIVE REPORT      PATIENT NAME: WINNIE ZAMAN                : 1990  MED REC NO: [id number]                       ROOM: [facility]  ACCOUNT NO: [id number]                       ADMIT DATE: 2024  PROVIDER: Alexander Jovel MD    DATE OF SERVICE:  2024    PREOPERATIVE DIAGNOSES:       1. 37-week pregnancy.     2. Abnormal glucose screen.     3. Fetal intolerance of labor.     4. Decreased fetal movement.     5. Transverse lie or unstable lie.    POSTOPERATIVE DIAGNOSES:       1. 37-week pregnancy.     2. Abnormal glucose screen.     3. Fetal intolerance of labor.     4. Decreased fetal movement.     5. Transverse lie or unstable lie.     6. With arm presentation, nuchal cord x1.    PROCEDURES PERFORMED:  Primary low-transverse , emergent.    SURGEON:  Alexander Jovel MD    ASSISTANT:  Echo Castillo RN    ANESTHESIA:  Epidural.    ESTIMATED BLOOD LOSS:  See nursing notes.    SPECIMENS REMOVED:  Cord blood.    INTRAOPERATIVE FINDINGS:  Normal uterus, tubes, and ovaries.  Nuchal cord x1 arm presentation.  Live born infant delivered.  Three-vessel cord.  Normal-appearing placenta.  Antibiotics given.  Cord blood drawn. Peds and Respiratory present at the time of delivery.     COMPLICATIONS:  None.    IMPLANTS:  None.    INDICATIONS:  See preoperative diagnosis.    DESCRIPTION OF PROCEDURE:  The patient was taken to the operating room after informed consent had been reviewed.  She was placed on the operating room table in a supine position in a left lateral tilt.  Her epidural was dosed and found to be adequate.  She was prepped and draped in normal sterile fashion.  A time-out was performed by me.  A Pfannenstiel skin incision was then made with a scalpel, taken down the underlying layer of fascia.  The fascia was nicked in the midline.  The fascial

## 2024-08-18 VITALS
SYSTOLIC BLOOD PRESSURE: 128 MMHG | HEART RATE: 62 BPM | BODY MASS INDEX: 31.94 KG/M2 | DIASTOLIC BLOOD PRESSURE: 75 MMHG | HEIGHT: 62 IN | WEIGHT: 173.56 LBS | OXYGEN SATURATION: 99 % | RESPIRATION RATE: 16 BRPM | TEMPERATURE: 97.9 F

## 2024-08-18 PROCEDURE — 6370000000 HC RX 637 (ALT 250 FOR IP): Performed by: OBSTETRICS & GYNECOLOGY

## 2024-08-18 RX ADMIN — DOCUSATE SODIUM 100 MG: 100 CAPSULE, LIQUID FILLED ORAL at 09:01

## 2024-08-18 RX ADMIN — IBUPROFEN 800 MG: 800 TABLET, FILM COATED ORAL at 00:05

## 2024-08-18 RX ADMIN — OXYCODONE HYDROCHLORIDE 10 MG: 10 TABLET ORAL at 06:37

## 2024-08-18 RX ADMIN — ACETAMINOPHEN 1000 MG: 500 TABLET ORAL at 09:00

## 2024-08-18 RX ADMIN — IBUPROFEN 800 MG: 800 TABLET, FILM COATED ORAL at 09:01

## 2024-08-18 RX ADMIN — ACETAMINOPHEN 1000 MG: 500 TABLET ORAL at 02:41

## 2024-08-18 RX ADMIN — OXYCODONE HYDROCHLORIDE 10 MG: 10 TABLET ORAL at 11:06

## 2024-08-18 ASSESSMENT — PAIN DESCRIPTION - ORIENTATION
ORIENTATION: LOWER

## 2024-08-18 ASSESSMENT — PAIN DESCRIPTION - DESCRIPTORS
DESCRIPTORS: SORE
DESCRIPTORS: ACHING;SORE
DESCRIPTORS: ACHING

## 2024-08-18 ASSESSMENT — PAIN SCALES - GENERAL
PAINLEVEL_OUTOF10: 5
PAINLEVEL_OUTOF10: 8
PAINLEVEL_OUTOF10: 6
PAINLEVEL_OUTOF10: 7

## 2024-08-18 ASSESSMENT — PAIN DESCRIPTION - LOCATION
LOCATION: ABDOMEN;INCISION

## 2024-08-18 ASSESSMENT — PAIN - FUNCTIONAL ASSESSMENT
PAIN_FUNCTIONAL_ASSESSMENT: ACTIVITIES ARE NOT PREVENTED

## 2024-08-18 NOTE — PROGRESS NOTES
Patient given discharge instructions. Patient aware when to contact MD. Patient aware to make a follow up appointment. Patient aware medications sent to pharmacy. Patient denies any questions or concerns at this time.      1240-Patient discharged to main entrance via wheelchair with baby in car seat. Patient's belongings on her persons.     seizures

## 2024-08-18 NOTE — DISCHARGE SUMMARY
[facility] Discharge Summary     Patient ID:  Marlene Caldwell  [id number]  34 y.o.  1990    Admit date: 8/16/2024    Discharge date and time: 8/18/24    Admitting Physician: Alexander Jovel MD     Discharge Physician: Alexander Jovel MD    Admission Diagnoses: 37 weeks gestation of pregnancy [Z3A.37]    Hospital Course: Marlene Caldwell had unremarkeable progressive recovery.  and Eating, ambulating, and voiding in a routine manner.    Significant Diagnostic Studies:   Recent Results (from the past 24 hour(s))   CBC    Collection Time: 08/17/24 10:42 AM   Result Value Ref Range    WBC 15.1 (H) 3.6 - 11.0 K/uL    RBC 2.70 (L) 3.80 - 5.20 M/uL    Hemoglobin 8.7 (L) 11.5 - 16.0 g/dL    Hematocrit 25.4 (L) 35.0 - 47.0 %    MCV 94.1 80.0 - 99.0 FL    MCH 32.2 26.0 - 34.0 PG    MCHC 34.3 30.0 - 36.5 g/dL    RDW 13.1 11.5 - 14.5 %    Platelets 249 150 - 400 K/uL    MPV 10.0 8.9 - 12.9 FL    Nucleated RBCs 0.0 0.0  WBC    nRBC 0.00 0.00 - 0.01 K/uL       Procedures: [facility]    Discharge Exam:  /75   Pulse 62   Temp 97.9 °F (36.6 °C) (Oral)   Resp 16   Ht 1.575 m (5' 2\")   Wt 78.7 kg (173 lb 9 oz)   LMP  (LMP Unknown)   SpO2 99%   Breastfeeding Unknown   BMI 31.74 kg/m²        Patient Instructions:   Current Discharge Medication List        START taking these medications    Details   docusate sodium (COLACE, DULCOLAX) 100 MG CAPS Take 100 mg by mouth 2 times daily  Qty: 60 capsule, Refills: 0      ibuprofen (ADVIL;MOTRIN) 800 MG tablet Take 1 tablet by mouth in the morning and 1 tablet at noon and 1 tablet in the evening.  Qty: 60 tablet, Refills: 0      oxyCODONE (ROXICODONE) 5 MG immediate release tablet Take 1 tablet by mouth every 6 hours as needed for Pain for up to 7 days. Max Daily Amount: 20 mg  Qty: 28 tablet, Refills: 0    Comments: Reduce doses taken as pain becomes manageable  Associated Diagnoses: Post-operative pain           CONTINUE these medications which have NOT CHANGED    Details   Lancets

## 2024-08-18 NOTE — PLAN OF CARE
Problem: Vaginal Birth or  Section  Goal: Fetal and maternal status remain reassuring during the birth process  Description:  Birth OB-Pregnancy care plan goal which identifies if the fetal and maternal status remain reassuring during the birth process  2024 by Meagan Oliver RN  Outcome: Progressing  2024 1319 by Beena Lind RN  Outcome: Progressing     Problem: Postpartum  Goal: Experiences normal postpartum course  Description:  Postpartum OB-Pregnancy care plan goal which identifies if the mother is experiencing a normal postpartum course  2024 by Meagan Oliver RN  Outcome: Progressing  2024 1319 by Beena Lind RN  Outcome: Progressing  Goal: Appropriate maternal -  bonding  Description:  Postpartum OB-Pregnancy care plan goal which identifies if the mother and  are bonding appropriately  2024 by Meagan Oliver RN  Outcome: Progressing  20249 by Beena Lind RN  Outcome: Progressing  Goal: Establishment of infant feeding pattern  Description:  Postpartum OB-Pregnancy care plan goal which identifies if the mother is establishing a feeding pattern with their   2024 by Meagan Oliver RN  Outcome: Progressing  20249 by Beena Lind RN  Outcome: Progressing  Goal: Incisions, wounds, or drain sites healing without S/S of infection  2024 by Meagan Oliver RN  Outcome: Progressing  Flowsheets (Taken 2024)  Incisions, Wounds, or Drain Sites Healing Without Sign and Symptoms of Infection:   ADMISSION and DAILY: Assess and document risk factors for pressure ulcer development   TWICE DAILY: Assess and document dressing/incision, wound bed, drain sites and surrounding tissue  2024 131 by Beena Lind RN  Outcome: Progressing     Problem: Pain  Goal: Verbalizes/displays adequate comfort level or baseline comfort level  2024 by Meagan Oliver RN  Outcome: 
  Problem: Vaginal Birth or  Section  Goal: Fetal and maternal status remain reassuring during the birth process  Description:  Birth OB-Pregnancy care plan goal which identifies if the fetal and maternal status remain reassuring during the birth process  2024 by Mojgan Ohara RN  Outcome: Progressing  2024 by Meagan Oliver RN  Outcome: Progressing     Problem: Postpartum  Goal: Experiences normal postpartum course  Description:  Postpartum OB-Pregnancy care plan goal which identifies if the mother is experiencing a normal postpartum course  2024 by Mojgan Ohara RN  Outcome: Progressing  2024 by Meagan Oliver RN  Outcome: Progressing  Goal: Appropriate maternal -  bonding  Description:  Postpartum OB-Pregnancy care plan goal which identifies if the mother and  are bonding appropriately  2024 by Mojgan Ohara RN  Outcome: Progressing  2024 by Meagan Oliver RN  Outcome: Progressing  Goal: Establishment of infant feeding pattern  Description:  Postpartum OB-Pregnancy care plan goal which identifies if the mother is establishing a feeding pattern with their   2024 by Mojgan Ohara RN  Outcome: Progressing  2024 by Meagan Oliver RN  Outcome: Progressing  Goal: Incisions, wounds, or drain sites healing without S/S of infection  2024 by Mojgan Ohara RN  Outcome: Progressing  Flowsheets (Taken 2024 0240)  Incisions, Wounds, or Drain Sites Healing Without Sign and Symptoms of Infection: TWICE DAILY: Assess and document skin integrity  2024 by Meagan Oliver RN  Outcome: Progressing  Flowsheets (Taken 2024)  Incisions, Wounds, or Drain Sites Healing Without Sign and Symptoms of Infection:   ADMISSION and DAILY: Assess and document risk factors for pressure ulcer development   TWICE DAILY: Assess and document dressing/incision, wound bed, drain sites and surrounding 
  Problem: Vaginal Birth or  Section  Goal: Fetal and maternal status remain reassuring during the birth process  Description:  Birth OB-Pregnancy care plan goal which identifies if the fetal and maternal status remain reassuring during the birth process  Outcome: Progressing     Problem: Pain  Goal: Verbalizes/displays adequate comfort level or baseline comfort level  Outcome: Progressing     Problem: Infection - Adult  Goal: Absence of infection at discharge  Outcome: Progressing  Goal: Absence of infection during hospitalization  Outcome: Progressing  Goal: Absence of fever/infection during anticipated neutropenic period  Outcome: Progressing     Problem: Safety - Adult  Goal: Free from fall injury  Outcome: Progressing     
  Problem: Vaginal Birth or  Section  Goal: Fetal and maternal status remain reassuring during the birth process  Description:  Birth OB-Pregnancy care plan goal which identifies if the fetal and maternal status remain reassuring during the birth process  Outcome: Progressing     Problem: Postpartum  Goal: Experiences normal postpartum course  Description:  Postpartum OB-Pregnancy care plan goal which identifies if the mother is experiencing a normal postpartum course  Outcome: Progressing  Goal: Appropriate maternal -  bonding  Description:  Postpartum OB-Pregnancy care plan goal which identifies if the mother and  are bonding appropriately  Outcome: Progressing  Goal: Establishment of infant feeding pattern  Description:  Postpartum OB-Pregnancy care plan goal which identifies if the mother is establishing a feeding pattern with their   Outcome: Progressing  Goal: Incisions, wounds, or drain sites healing without S/S of infection  Outcome: Progressing     Problem: Pain  Goal: Verbalizes/displays adequate comfort level or baseline comfort level  Outcome: Progressing  Flowsheets  Taken 2024 0735 by Beena Lind RN  Verbalizes/displays adequate comfort level or baseline comfort level:   Encourage patient to monitor pain and request assistance   Assess pain using appropriate pain scale  Taken 2024 0608 by Mojgan Ohara RN  Verbalizes/displays adequate comfort level or baseline comfort level:   Encourage patient to monitor pain and request assistance   Assess pain using appropriate pain scale   Administer analgesics based on type and severity of pain and evaluate response  Taken 2024 0455 by Mojgan Ohara RN  Verbalizes/displays adequate comfort level or baseline comfort level:   Encourage patient to monitor pain and request assistance   Assess pain using appropriate pain scale   Administer analgesics based on type and severity of pain and evaluate response  Taken 
1800 Transferred from L&D. Postpartum and  Care Care booklet given. Post-birth warning signs sheet and local community resource sheet given. Birth Certificate worksheet given. Instructed to call nurse first time ambulating.   Problem: Vaginal Birth or  Section  Goal: Fetal and maternal status remain reassuring during the birth process  Description:  Birth OB-Pregnancy care plan goal which identifies if the fetal and maternal status remain reassuring during the birth process  2024 by Mavis Shabazz RN  Outcome: HH/HSPC Progressing  2024 0559 by Jessy Moss RN  Outcome: Progressing  Flowsheets (Taken 2024 0549)  Fetal and Maternal Status Remain Reassuring During the Birth Process:   Monitor vital signs   Monitor fetal heart rate   Monitor uterine activity   Monitor labor progression (Vaginal delivery)     Problem: Postpartum  Goal: Experiences normal postpartum course  Description:  Postpartum OB-Pregnancy care plan goal which identifies if the mother is experiencing a normal postpartum course  Outcome: HH/HSPC Progressing  Goal: Appropriate maternal -  bonding  Description:  Postpartum OB-Pregnancy care plan goal which identifies if the mother and  are bonding appropriately  Outcome: HH/HSPC Progressing  Goal: Establishment of infant feeding pattern  Description:  Postpartum OB-Pregnancy care plan goal which identifies if the mother is establishing a feeding pattern with their   Outcome: HH/HSPC Progressing  Goal: Incisions, wounds, or drain sites healing without S/S of infection  Outcome: HH/HSPC Progressing     Problem: Pain  Goal: Verbalizes/displays adequate comfort level or baseline comfort level  2024 by Mavis Shabazz RN  Outcome: HH/HSPC Progressing  Flowsheets (Taken 2024 1801)  Verbalizes/displays adequate comfort level or baseline comfort level:   Encourage patient to monitor pain and request assistance   Assess pain 
comfort level:   Encourage patient to monitor pain and request assistance   Assess pain using appropriate pain scale  8/18/2024 0347 by Mojgan Ohara RN  Outcome: Progressing  Flowsheets  Taken 8/18/2024 0240 by Mojgan Ohara RN  Verbalizes/displays adequate comfort level or baseline comfort level:   Encourage patient to monitor pain and request assistance   Assess pain using appropriate pain scale   Administer analgesics based on type and severity of pain and evaluate response  Taken 8/17/2024 2350 by Meagan Oliver RN  Verbalizes/displays adequate comfort level or baseline comfort level:   Encourage patient to monitor pain and request assistance   Assess pain using appropriate pain scale   Administer analgesics based on type and severity of pain and evaluate response   Implement non-pharmacological measures as appropriate and evaluate response   Consider cultural and social influences on pain and pain management   Notify Licensed Independent Practitioner if interventions unsuccessful or patient reports new pain     Problem: Infection - Adult  Goal: Absence of infection at discharge  8/18/2024 1245 by Beena Lind RN  Outcome: HH/HSPC Resolved Met  8/18/2024 0347 by Mojgan Ohara RN  Outcome: Progressing  Flowsheets (Taken 8/18/2024 0240)  Absence of infection at discharge: Assess and monitor for signs and symptoms of infection  Goal: Absence of infection during hospitalization  8/18/2024 1245 by Beena Lind RN  Outcome: HH/HSPC Resolved Met  8/18/2024 0347 by Mojgan Ohara RN  Outcome: Progressing  Flowsheets (Taken 8/18/2024 0240)  Absence of infection during hospitalization: Assess and monitor for signs and symptoms of infection  Goal: Absence of fever/infection during anticipated neutropenic period  8/18/2024 1245 by Beena Lind RN  Outcome: HH/HSPC Resolved Met  8/18/2024 0347 by Mojgan Ohara RN  Outcome: Progressing     Problem: Safety - Adult  Goal: Free from fall injury  8/18/2024 1245 by 
Progressing  8/16/2024 1804 by Mavis Shabazz RN  Outcome: /[facility] Progressing     Problem: Discharge Planning  Goal: Discharge to home or other facility with appropriate resources  8/16/2024 2229 by Mojgan Ohara RN  Outcome: Progressing  Flowsheets (Taken 8/16/2024 2050)  Discharge to home or other facility with appropriate resources: Identify barriers to discharge with patient and caregiver  8/16/2024 1804 by Mavis Shabazz RN  Outcome: /[facility] Progressing     Problem: Chronic Conditions and Co-morbidities  Goal: Patient's chronic conditions and co-morbidity symptoms are monitored and maintained or improved  8/16/2024 2229 by Mojgan Ohara RN  Outcome: Progressing  Flowsheets (Taken 8/16/2024 2050)  Care Plan - Patient's Chronic Conditions and Co-Morbidity Symptoms are Monitored and Maintained or Improved: Monitor and assess patient's chronic conditions and comorbid symptoms for stability, deterioration, or improvement  8/16/2024 1804 by Mavis Shabazz RN  Outcome: /[facility] Progressing

## 2024-08-18 NOTE — DISCHARGE INSTRUCTIONS
your doctor has told you not to).  Ask your doctor for a different pain medicine.     If your doctor prescribed antibiotics, take them as directed. Do not stop taking them just because you feel better. You need to take the full course of antibiotics.   Incision care    If you have strips of tape on the incision, leave the tape on for a week or until it falls off.     Wash the area daily with warm, soapy water, and pat it dry. Don't use hydrogen peroxide or alcohol, which can slow healing. You may cover the area with a gauze bandage if it weeps or rubs against clothing. Change the bandage every day.     Keep the area clean and dry.   Other instructions    If you breastfeed your baby, you may be more comfortable while you are healing if you don't rest your baby on your belly. Try tucking your baby under your arm, with your baby's body along the side you will be feeding on. Support your baby's upper body with your arm. With that hand you can control your baby's head to bring your baby's mouth to your breast. This is sometimes called the football hold.   Follow-up care is a key part of your treatment and safety. Be sure to make and go to all appointments, and call your doctor if you are having problems. It's also a good idea to know your test results and keep a list of the medicines you take.  When should you call for help?  Share this information with your partner, family, or a friend. They can help you watch for warning signs.  Call 911  anytime you think you may need emergency care. For example, call if:    You feel you cannot stop from hurting yourself, your baby, or someone else.     You passed out (lost consciousness).     You have chest pain, are short of breath, or cough up blood.     You have a seizure.   Where to get help 24 hours a day, 7 days a week   If you or someone you know talks about suicide, self-harm, a mental health crisis, a substance use crisis, or any other kind of emotional distress, get help

## 2024-08-23 ENCOUNTER — APPOINTMENT (OUTPATIENT)
Facility: HOSPITAL | Age: 34
End: 2024-08-23
Payer: COMMERCIAL

## 2024-08-23 ENCOUNTER — HOSPITAL ENCOUNTER (EMERGENCY)
Facility: HOSPITAL | Age: 34
Discharge: HOME | End: 2024-08-23
Attending: EMERGENCY MEDICINE
Payer: COMMERCIAL

## 2024-08-23 VITALS
TEMPERATURE: 98.5 F | DIASTOLIC BLOOD PRESSURE: 77 MMHG | OXYGEN SATURATION: 97 % | SYSTOLIC BLOOD PRESSURE: 138 MMHG | HEART RATE: 52 BPM | RESPIRATION RATE: 16 BRPM

## 2024-08-23 DIAGNOSIS — M79.89 LEG SWELLING: Primary | ICD-10-CM

## 2024-08-23 DIAGNOSIS — I10 HYPERTENSION, UNSPECIFIED TYPE: ICD-10-CM

## 2024-08-23 LAB
ALBUMIN SERPL-MCNC: 3.1 G/DL (ref 3.5–5)
ALBUMIN/GLOB SERPL: 1 (ref 1.1–2.2)
ALP SERPL-CCNC: 97 U/L (ref 45–117)
ALT SERPL-CCNC: 18 U/L (ref 12–78)
ANION GAP SERPL CALC-SCNC: 3 MMOL/L (ref 5–15)
APPEARANCE UR: CLEAR
AST SERPL W P-5'-P-CCNC: 15 U/L (ref 15–37)
BACTERIA URNS QL MICRO: NEGATIVE /HPF
BASOPHILS # BLD: 0.1 K/UL (ref 0–0.1)
BASOPHILS NFR BLD: 1 % (ref 0–1)
BILIRUB SERPL-MCNC: 0.2 MG/DL (ref 0.2–1)
BILIRUB UR QL: NEGATIVE
BNP SERPL-MCNC: 1080 PG/ML
BUN SERPL-MCNC: 10 MG/DL (ref 6–20)
BUN/CREAT SERPL: 15 (ref 12–20)
CA-I BLD-MCNC: 9.1 MG/DL (ref 8.5–10.1)
CHLORIDE SERPL-SCNC: 112 MMOL/L (ref 97–108)
CO2 SERPL-SCNC: 25 MMOL/L (ref 21–32)
COLOR UR: ABNORMAL
CREAT SERPL-MCNC: 0.66 MG/DL (ref 0.55–1.02)
CREAT UR-MCNC: 17 MG/DL
DIFFERENTIAL METHOD BLD: ABNORMAL
EOSINOPHIL # BLD: 0.2 K/UL (ref 0–0.4)
EOSINOPHIL NFR BLD: 2 % (ref 0–7)
EPITH CASTS URNS QL MICRO: ABNORMAL /LPF
ERYTHROCYTE [DISTWIDTH] IN BLOOD BY AUTOMATED COUNT: 13 % (ref 11.5–14.5)
GLOBULIN SER CALC-MCNC: 3.2 G/DL (ref 2–4)
GLUCOSE SERPL-MCNC: 112 MG/DL (ref 65–100)
GLUCOSE UR STRIP.AUTO-MCNC: NEGATIVE MG/DL
HCT VFR BLD AUTO: 29.3 % (ref 35–47)
HGB BLD-MCNC: 9.6 G/DL (ref 11.5–16)
HGB UR QL STRIP: ABNORMAL
IMM GRANULOCYTES # BLD AUTO: 0 K/UL (ref 0–0.04)
IMM GRANULOCYTES NFR BLD AUTO: 0 % (ref 0–0.5)
KETONES UR QL STRIP.AUTO: NEGATIVE MG/DL
LEUKOCYTE ESTERASE UR QL STRIP.AUTO: NEGATIVE
LYMPHOCYTES # BLD: 3.6 K/UL (ref 0.8–3.5)
LYMPHOCYTES NFR BLD: 38 % (ref 12–49)
MCH RBC QN AUTO: 31.7 PG (ref 26–34)
MCHC RBC AUTO-ENTMCNC: 32.8 G/DL (ref 30–36.5)
MCV RBC AUTO: 96.7 FL (ref 80–99)
MONOCYTES # BLD: 0.5 K/UL (ref 0–1)
MONOCYTES NFR BLD: 5 % (ref 5–13)
NEUTS SEG # BLD: 5.1 K/UL (ref 1.8–8)
NEUTS SEG NFR BLD: 54 % (ref 32–75)
NITRITE UR QL STRIP.AUTO: NEGATIVE
NRBC # BLD: 0 K/UL (ref 0–0.01)
NRBC BLD-RTO: 0 PER 100 WBC
PH UR STRIP: 7 (ref 5–8)
PLATELET # BLD AUTO: 282 K/UL (ref 150–400)
PMV BLD AUTO: 9.8 FL (ref 8.9–12.9)
POTASSIUM SERPL-SCNC: 4.5 MMOL/L (ref 3.5–5.1)
PROT SERPL-MCNC: 6.3 G/DL (ref 6.4–8.2)
PROT UR STRIP-MCNC: NEGATIVE MG/DL
PROT UR-MCNC: 5 MG/DL (ref 0–11.9)
PROT/CREAT UR-RTO: 0.3
RBC # BLD AUTO: 3.03 M/UL (ref 3.8–5.2)
RBC #/AREA URNS HPF: ABNORMAL /HPF (ref 0–5)
SODIUM SERPL-SCNC: 140 MMOL/L (ref 136–145)
SP GR UR REFRACTOMETRY: <1.005 (ref 1–1.03)
TROPONIN I SERPL HS-MCNC: 7 NG/L (ref 0–51)
URINE CULTURE IF INDICATED: ABNORMAL
UROBILINOGEN UR QL STRIP.AUTO: 0.1 EU/DL (ref 0.1–1)
WBC # BLD AUTO: 9.6 K/UL (ref 3.6–11)
WBC URNS QL MICRO: ABNORMAL /HPF (ref 0–4)

## 2024-08-23 PROCEDURE — 83880 ASSAY OF NATRIURETIC PEPTIDE: CPT

## 2024-08-23 PROCEDURE — 84156 ASSAY OF PROTEIN URINE: CPT

## 2024-08-23 PROCEDURE — 99285 EMERGENCY DEPT VISIT HI MDM: CPT

## 2024-08-23 PROCEDURE — 6360000002 HC RX W HCPCS: Performed by: EMERGENCY MEDICINE

## 2024-08-23 PROCEDURE — 81001 URINALYSIS AUTO W/SCOPE: CPT

## 2024-08-23 PROCEDURE — 36415 COLL VENOUS BLD VENIPUNCTURE: CPT

## 2024-08-23 PROCEDURE — 82570 ASSAY OF URINE CREATININE: CPT

## 2024-08-23 PROCEDURE — 96374 THER/PROPH/DIAG INJ IV PUSH: CPT

## 2024-08-23 PROCEDURE — 85025 COMPLETE CBC W/AUTO DIFF WBC: CPT

## 2024-08-23 PROCEDURE — 71045 X-RAY EXAM CHEST 1 VIEW: CPT

## 2024-08-23 PROCEDURE — 80053 COMPREHEN METABOLIC PANEL: CPT

## 2024-08-23 PROCEDURE — 84484 ASSAY OF TROPONIN QUANT: CPT

## 2024-08-23 PROCEDURE — 96375 TX/PRO/DX INJ NEW DRUG ADDON: CPT

## 2024-08-23 RX ORDER — HYDRALAZINE HYDROCHLORIDE 20 MG/ML
10 INJECTION INTRAMUSCULAR; INTRAVENOUS
Status: COMPLETED | OUTPATIENT
Start: 2024-08-23 | End: 2024-08-23

## 2024-08-23 RX ORDER — FUROSEMIDE 10 MG/ML
20 INJECTION INTRAMUSCULAR; INTRAVENOUS ONCE
Status: COMPLETED | OUTPATIENT
Start: 2024-08-23 | End: 2024-08-23

## 2024-08-23 RX ORDER — FUROSEMIDE 20 MG/1
20 TABLET ORAL DAILY
Qty: 5 TABLET | Refills: 0 | Status: SHIPPED | OUTPATIENT
Start: 2024-08-23 | End: 2024-08-28

## 2024-08-23 RX ORDER — LABETALOL HYDROCHLORIDE 5 MG/ML
20 INJECTION, SOLUTION INTRAVENOUS ONCE
Status: DISCONTINUED | OUTPATIENT
Start: 2024-08-23 | End: 2024-08-23

## 2024-08-23 RX ADMIN — FUROSEMIDE 20 MG: 10 INJECTION, SOLUTION INTRAMUSCULAR; INTRAVENOUS at 22:25

## 2024-08-23 RX ADMIN — HYDRALAZINE HYDROCHLORIDE 10 MG: 20 INJECTION, SOLUTION INTRAMUSCULAR; INTRAVENOUS at 22:25

## 2024-08-24 NOTE — ED TRIAGE NOTES
Pt states she had a  one week ago without complications. Pt started with bilateral feet swelling and hypertension since the .

## 2024-08-24 NOTE — DISCHARGE INSTRUCTIONS
Thank you for choosing our Emergency Department for your care.  It is our privilege to care for you in your time of need.  In the next several days, you may receive a survey via email or mailed to your home about your experience with our team.  We would greatly appreciate you taking a few minutes to complete the survey, as we use this information to learn what we have done well and what we could be doing better. Thank you for trusting us with your care!    Below you will find a list of your tests from today's visit.   Labs  Recent Results (from the past 12 hour(s))   CBC with Auto Differential    Collection Time: 08/23/24  9:06 PM   Result Value Ref Range    WBC 9.6 3.6 - 11.0 K/uL    RBC 3.03 (L) 3.80 - 5.20 M/uL    Hemoglobin 9.6 (L) 11.5 - 16.0 g/dL    Hematocrit 29.3 (L) 35.0 - 47.0 %    MCV 96.7 80.0 - 99.0 FL    MCH 31.7 26.0 - 34.0 PG    MCHC 32.8 30.0 - 36.5 g/dL    RDW 13.0 11.5 - 14.5 %    Platelets 282 150 - 400 K/uL    MPV 9.8 8.9 - 12.9 FL    Nucleated RBCs 0.0 0.0  WBC    nRBC 0.00 0.00 - 0.01 K/uL    Neutrophils % 54 32 - 75 %    Lymphocytes % 38 12 - 49 %    Monocytes % 5 5 - 13 %    Eosinophils % 2 0 - 7 %    Basophils % 1 0 - 1 %    Immature Granulocytes % 0 0 - 0.5 %    Neutrophils Absolute 5.1 1.8 - 8.0 K/UL    Lymphocytes Absolute 3.6 (H) 0.8 - 3.5 K/UL    Monocytes Absolute 0.5 0.0 - 1.0 K/UL    Eosinophils Absolute 0.2 0.0 - 0.4 K/UL    Basophils Absolute 0.1 0.0 - 0.1 K/UL    Immature Granulocytes Absolute 0.0 0.00 - 0.04 K/UL    Differential Type AUTOMATED     Comprehensive Metabolic Panel    Collection Time: 08/23/24  9:06 PM   Result Value Ref Range    Sodium 140 136 - 145 mmol/L    Potassium 4.5 3.5 - 5.1 mmol/L    Chloride 112 (H) 97 - 108 mmol/L    CO2 25 21 - 32 mmol/L    Anion Gap 3 (L) 5 - 15 mmol/L    Glucose 112 (H) 65 - 100 mg/dL    BUN 10 6 - 20 mg/dL    Creatinine 0.66 0.55 - 1.02 mg/dL    BUN/Creatinine Ratio 15 12 - 20      Est, Glom Filt Rate >90 >60 ml/min/1.73m2

## 2024-08-24 NOTE — ED PROVIDER NOTES
encounter.     Current Outpatient Medications   Medication Sig Dispense Refill    furosemide (LASIX) 20 MG tablet Take 1 tablet by mouth daily for 5 days 5 tablet 0    docusate sodium (COLACE, DULCOLAX) 100 MG CAPS Take 100 mg by mouth 2 times daily 60 capsule 0    ibuprofen (ADVIL;MOTRIN) 800 MG tablet Take 1 tablet by mouth in the morning and 1 tablet at noon and 1 tablet in the evening. 60 tablet 0    oxyCODONE (ROXICODONE) 5 MG immediate release tablet Take 1 tablet by mouth every 6 hours as needed for Pain for up to 7 days. Max Daily Amount: 20 mg 28 tablet 0    Lancets MISC 1 each by Does not apply route 4 times daily 600 each 1    Prenatal Vit-Fe Fumarate-FA (PRENATAL PO) Take by mouth         Social Determinants of Health:   Social Determinants of Health     Tobacco Use: High Risk (8/23/2024)    Patient History     Smoking Tobacco Use: Every Day     Smokeless Tobacco Use: Never     Passive Exposure: Current   Alcohol Use: Not At Risk (2/10/2024)    AUDIT-C     Frequency of Alcohol Consumption: Never     Average Number of Drinks: Patient does not drink     Frequency of Binge Drinking: Never   Financial Resource Strain: Not on file   Food Insecurity: No Food Insecurity (8/16/2024)    Hunger Vital Sign     Worried About Running Out of Food in the Last Year: Never true     Ran Out of Food in the Last Year: Never true   Transportation Needs: No Transportation Needs (8/16/2024)    PRAPARE - Transportation     Lack of Transportation (Medical): No     Lack of Transportation (Non-Medical): No   Physical Activity: Not on file   Stress: Not on file   Social Connections: Not on file   Intimate Partner Violence: Not on file   Depression: Not at risk (8/23/2023)    PHQ-2     PHQ-2 Score: 0   Housing Stability: Low Risk  (8/16/2024)    Housing Stability Vital Sign     Unable to Pay for Housing in the Last Year: No     Number of Times Moved in the Last Year: 1     Homeless in the Last Year: No   Postpartum Depression: Low

## 2024-08-25 LAB
EKG ATRIAL RATE: 49 BPM
EKG DIAGNOSIS: NORMAL
EKG P AXIS: 46 DEGREES
EKG P-R INTERVAL: 144 MS
EKG Q-T INTERVAL: 452 MS
EKG QRS DURATION: 84 MS
EKG QTC CALCULATION (BAZETT): 408 MS
EKG R AXIS: 50 DEGREES
EKG T AXIS: 61 DEGREES
EKG VENTRICULAR RATE: 49 BPM

## 2024-09-30 ENCOUNTER — POSTPARTUM VISIT (OUTPATIENT)
Age: 34
End: 2024-09-30

## 2024-09-30 VITALS
WEIGHT: 163.56 LBS | HEIGHT: 62 IN | BODY MASS INDEX: 30.1 KG/M2 | SYSTOLIC BLOOD PRESSURE: 110 MMHG | DIASTOLIC BLOOD PRESSURE: 68 MMHG

## 2024-09-30 DIAGNOSIS — Z98.891 HISTORY OF CESAREAN DELIVERY: ICD-10-CM

## 2024-09-30 PROCEDURE — 0503F POSTPARTUM CARE VISIT: CPT | Performed by: OBSTETRICS & GYNECOLOGY

## 2024-09-30 ASSESSMENT — ENCOUNTER SYMPTOMS
GASTROINTESTINAL NEGATIVE: 1
RESPIRATORY NEGATIVE: 1

## 2024-09-30 NOTE — PROGRESS NOTES
Marlene Caldwell is a , 34 y.o. female   Patient's last menstrual period was 2024 (exact date).    She presents for her post-partum    She is having no significant problems.      Menstrual status:  Cycles are postpartum.    Flow: postpartum.      She does not have dysmenorrhea.      Medical conditions:  Since her last annual GYN exam about one year ago, she has not the following changes in her health history: none.     Mammogram History:    GIULIA Results (most recent):  @[facility]([id number]:1)@     DEXA Results (most recent):  @[facility]([id number]:1)@       Past Medical History:   Diagnosis Date    Asthma     Gestational diabetes     Headache     Obesity      Past Surgical History:   Procedure Laterality Date     SECTION N/A 2024     SECTION performed by Alexander Jovel MD at [facility] L&D OR    WISDOM TOOTH EXTRACTION         Prior to Admission medications    Medication Sig Start Date End Date Taking? Authorizing Provider   Prenatal Vit-Fe Fumarate-FA (PRENATAL PO) Take by mouth   Yes Provider, MD Mili       No Known Allergies       Tobacco History:  reports that she has been smoking cigarettes. She has been exposed to tobacco smoke. She has never used smokeless tobacco.  Alcohol use:  reports that she does not currently use alcohol.  Drug use:  reports that she does not currently use drugs.    Family Medical/Cancer History:   Family History   Problem Relation Age of Onset    Breast Cancer Maternal Grandfather     Breast Cancer Paternal Aunt         Review of Systems   Constitutional: Negative.    Respiratory: Negative.     Cardiovascular: Negative.    Gastrointestinal: Negative.    Genitourinary: Negative.    Musculoskeletal: Negative.    Neurological: Negative.    Psychiatric/Behavioral: Negative.           /68 (Site: Left Upper Arm, Position: Sitting, Cuff Size: Small Adult)   Ht 1.575 m (5' 2\")   Wt 74.2 kg (163 lb 9 oz)   LMP 2024 (Exact Date) Comment:

## 2024-09-30 NOTE — PROGRESS NOTES
Chief Complaint   Patient presents with    Postpartum Care     Bottle feeding, LMP-ended 9-29-24, Pap-02-09-24/nl     /68 (Site: Left Upper Arm, Position: Sitting, Cuff Size: Small Adult)   Ht 1.575 m (5' 2\")   Wt 74.2 kg (163 lb 9 oz)   LMP 09/29/2024 (Exact Date) Comment: right handed  Breastfeeding No   BMI 29.92 kg/m²

## (undated) DEVICE — SUTURE MONOCRYL + SZ 4-0 L27IN ABSRB UD L19MM PS-2 3/8 CIR MCP426H

## (undated) DEVICE — FORCEPS TISS L6.25IN S STL ADAIR STYL

## (undated) DEVICE — STERILE C SECT DRP W/ WIND

## (undated) DEVICE — APPLICATOR MEDICATED 26 CC SOLUTION SCRB TEAL CHLORAPREP

## (undated) DEVICE — THE STERILE LIGHT HANDLE COVER IS USED WITH STERIS SURGICAL LIGHTING AND VISUALIZATION SYSTEMS.

## (undated) DEVICE — BAG,SPONGE COUNTER,CLEAR,50/BX,5BX/CS: Brand: MEDLINE

## (undated) DEVICE — C-SECTION: Brand: MEDLINE INDUSTRIES, INC.

## (undated) DEVICE — Z DISCONTINUED NO SUB SUTURE PLN GUT SZ 2-0 L27IN ABSRB YELLOWISH TAN L70MM XLH 53T

## (undated) DEVICE — SPONGE DRN W4XL4IN RAYON/POLYESTER 6 PLY NONWOVEN PRECUT 2 PER PK

## (undated) DEVICE — SOLUTION IRRIG 1000ML 0.9% SOD CHL USP POUR PLAS BTL

## (undated) DEVICE — STERILE POLYISOPRENE POWDER-FREE SURGICAL GLOVES WITH EMOLLIENT COATING: Brand: PROTEXIS

## (undated) DEVICE — SUTURE ABSORBABLE BRAIDED 0 CT 36 IN DA UD VICRYL VCP958H

## (undated) DEVICE — SUTURE VICRYL + SZ 2-0 L36IN ABSRB UD L36MM CT-1 1/2 CIR VCP945H

## (undated) DEVICE — TRAY URIN CATH PED 16FR BLLN 5CC INDWL STR TIP INF CTRL

## (undated) DEVICE — ELECTRODE PT RET AD L9FT HI MOIST COND ADH HYDRGEL CORDED